# Patient Record
Sex: FEMALE | Race: WHITE | NOT HISPANIC OR LATINO | Employment: FULL TIME | URBAN - METROPOLITAN AREA
[De-identification: names, ages, dates, MRNs, and addresses within clinical notes are randomized per-mention and may not be internally consistent; named-entity substitution may affect disease eponyms.]

---

## 2017-01-24 ENCOUNTER — PATIENT MESSAGE (OUTPATIENT)
Dept: MEDICAL GROUP | Facility: PHYSICIAN GROUP | Age: 47
End: 2017-01-24

## 2017-01-24 DIAGNOSIS — H60.502 ACUTE OTITIS EXTERNA OF LEFT EAR, UNSPECIFIED TYPE: ICD-10-CM

## 2017-01-24 DIAGNOSIS — E03.8 OTHER SPECIFIED HYPOTHYROIDISM: ICD-10-CM

## 2017-01-24 RX ORDER — IBUPROFEN 200 MG
200 TABLET ORAL EVERY 6 HOURS PRN
Qty: 270 TAB | Refills: 3 | Status: SHIPPED | OUTPATIENT
Start: 2017-01-24 | End: 2019-08-21

## 2017-01-24 RX ORDER — LIOTHYRONINE SODIUM 5 UG/1
TABLET ORAL
Qty: 180 TAB | Refills: 3 | Status: SHIPPED | OUTPATIENT
Start: 2017-01-24 | End: 2018-06-25 | Stop reason: SDUPTHER

## 2017-01-24 RX ORDER — NEOMYCIN SULFATE, POLYMYXIN B SULFATE AND HYDROCORTISONE 10; 3.5; 1 MG/ML; MG/ML; [USP'U]/ML
4 SUSPENSION/ DROPS AURICULAR (OTIC) 3 TIMES DAILY
Qty: 1 BOTTLE | Refills: 1 | Status: SHIPPED | OUTPATIENT
Start: 2017-01-24 | End: 2018-02-21 | Stop reason: SDUPTHER

## 2017-01-24 RX ORDER — LEVOTHYROXINE SODIUM 88 UG/1
TABLET ORAL
Qty: 90 TAB | Refills: 3 | Status: SHIPPED | OUTPATIENT
Start: 2017-01-24 | End: 2018-02-21 | Stop reason: SDUPTHER

## 2017-01-24 RX ORDER — ACETAMINOPHEN 325 MG/1
650 TABLET ORAL EVERY 4 HOURS PRN
Qty: 270 TAB | Refills: 3 | Status: SHIPPED | OUTPATIENT
Start: 2017-01-24 | End: 2022-06-07

## 2017-01-24 RX ORDER — LORATADINE 10 MG/1
10 TABLET ORAL DAILY
Qty: 90 TAB | Refills: 3 | Status: SHIPPED | OUTPATIENT
Start: 2017-01-24 | End: 2018-10-24

## 2017-01-24 NOTE — TELEPHONE ENCOUNTER
Patient would also wants a refill on fluocinolone acetonide oil    Was the patient seen in the last year in this department? Yes     Does patient have an active prescription for medications requested? No     Received Request Via: Patient

## 2017-03-16 ENCOUNTER — OFFICE VISIT (OUTPATIENT)
Dept: URGENT CARE | Facility: PHYSICIAN GROUP | Age: 47
End: 2017-03-16
Payer: COMMERCIAL

## 2017-03-16 VITALS
HEART RATE: 85 BPM | SYSTOLIC BLOOD PRESSURE: 114 MMHG | RESPIRATION RATE: 16 BRPM | TEMPERATURE: 99.5 F | DIASTOLIC BLOOD PRESSURE: 78 MMHG | OXYGEN SATURATION: 96 %

## 2017-03-16 DIAGNOSIS — H60.501 ACUTE OTITIS EXTERNA OF RIGHT EAR, UNSPECIFIED TYPE: ICD-10-CM

## 2017-03-16 PROCEDURE — 99214 OFFICE O/P EST MOD 30 MIN: CPT | Performed by: PHYSICIAN ASSISTANT

## 2017-03-16 RX ORDER — CIPROFLOXACIN AND DEXAMETHASONE 3; 1 MG/ML; MG/ML
4 SUSPENSION/ DROPS AURICULAR (OTIC) 2 TIMES DAILY
Qty: 2.8 ML | Refills: 0 | Status: SHIPPED | OUTPATIENT
Start: 2017-03-16 | End: 2017-03-23

## 2017-03-16 NOTE — PROGRESS NOTES
Chief Complaint   Patient presents with   • Otalgia     3 days.        HISTORY OF PRESENT ILLNESS: Patient is a 47 y.o. female who presents today for 3 days of right ear pain.  Worsening overall.   She feels it is in the canal.   She admits she has hx of dermatitis/chronic itching around the ear and will even itch it in her sleep. She started feeling swelling and discomfort in the last few days worsening Tuesday night, Wednesday and now today. She will put her finger there and feel some wetness.   There is muffling to hearing.   Tenderness around the ear.  No fevers or chills.  No preceding URI symptoms.   No hx of DM.   Used her left over Cortisporin and Ofloxacin and did not help.     Patient Active Problem List    Diagnosis Date Noted   • Malignant neoplasm of breast (female) (CMS-HCC) 05/26/2016   • Right wrist pain 08/27/2015   • Personal history of breast cancer 08/27/2015   • ADD (attention deficit disorder) 08/27/2015   • Hypothyroid 08/22/2013       Allergies:Erythromycin    Current Outpatient Prescriptions Ordered in [facility]   Medication Sig Dispense Refill   • liothyronine (CYTOMEL) 5 MCG Tab TAKE 1 TABLET TWICE A  Tab 3   • levothyroxine (SYNTHROID) 88 MCG Tab TAKE 1 TABLET DAILY 90 Tab 3   • amphetamine-dextroamphetamine (ADDERALL) 10 MG Tab      • lisdexamfetamine (VYVANSE) 50 MG capsule Take 50 mg by mouth every morning.     • Calcium Carbonate-Vitamin D (CALCIUM + D PO) Take  by mouth every day.       • neomycin-polymyxin-HC (PEDIOTIC HC) 3.5-29571-5 Suspension Place 4 Drops in ear 3 times a day. 1 Bottle 1   • ibuprofen (MOTRIN) 200 MG Tab Take 1 Tab by mouth every 6 hours as needed for Mild Pain, Fever, Headache or Inflammation. 270 Tab 3   • acetaminophen (TYLENOL) 325 MG Tab Take 2 Tabs by mouth every four hours as needed for Mild Pain, Moderate Pain or Fever. 270 Tab 3   • loratadine (CLARITIN) 10 MG Tab Take 1 Tab by mouth every day. 90 Tab 3   • DM-Doxylamine-Acetaminophen 15-6.  MG/15ML Liquid Take 15 mL by mouth 2 times a day as needed. 1 Bottle 11   • cyclobenzaprine (FLEXERIL) 5 MG tablet Take 1 Tab by mouth 3 times a day as needed for Moderate Pain or Severe Pain. 30 Tab 0   • amoxicillin-clavulanate (AUGMENTIN) 875-125 MG Tab Take 1 Tab by mouth 2 times a day. 20 Tab 0   • Lisdexamfetamine Dimesylate 60 MG Cap Take 60 mg by mouth every day.     • Lorcaserin HCl (BELVIQ) 10 MG Tab Take 1 Tab by mouth 2 Times a Day. 60 Tab 2   • ibuprofen (MOTRIN) 200 MG Tab Take 600 mg by mouth every 6 hours as needed.     • Glucos-Chondroit-Hyaluron-MSM (GLUCOSAMINE CHONDROITIN JOINT PO) Take  by mouth.     • Lisdexamfetamine Dimesylate 60 MG Cap Take 60 mg by mouth every day.     • VITAMIN D, ERGOCALCIFEROL, PO Take  by mouth every day.       • Multiple Vitamin (MULTIVITAMIN PO) Take  by mouth every day.         No current Epic-ordered facility-administered medications on file.       Past Medical History   Diagnosis Date   • Invasive ductal carcinoma of breast (CMS-[facility])      Breast left, Dr. Marte   • ADD (attention deficit disorder with hyperactivity)      Dr. Desi Henderson   • Anesthesia      PONV   • Cancer (CMS-HCC) 2006     breast   • Disorder of thyroid    • Pain      hip, lower back       Social History   Substance Use Topics   • Smoking status: Never Smoker    • Smokeless tobacco: Never Used   • Alcohol Use: 0.0 oz/week     0 Standard drinks or equivalent per week      Comment: 1-2 dinks per month        Family Status   Relation Status Death Age   • Maternal Grandfather       Family History   Problem Relation Age of Onset   • Diabetes Mother    • Hypertension Mother    • Cancer Mother      skin - non-melanoma   • Thyroid Mother    • Diabetes Maternal Grandmother    • Heart Disease Maternal Grandfather    • Hypertension Father    • Cancer Father      prostate   • Stroke Paternal Grandfather    • Heart Disease Maternal Uncle        ROS:  Review of Systems   Constitutional:  Negative for fever, chills, weight loss and malaise/fatigue.   HENT: SEE HPI  Eyes: Negative for blurred vision.   Respiratory: Negative for cough, sputum production, shortness of breath and wheezing.    Cardiovascular: Negative for chest pain, palpitations, orthopnea and leg swelling.   Gastrointestinal: Negative for heartburn, nausea, vomiting and abdominal pain.   All other systems reviewed and are negative.       Exam:   Blood pressure 114/78, pulse 85, temperature 37.5 °C (99.5 °F), resp. rate 16, SpO2 96 %.  General:  Well nourished, well developed female in NAD  Eyes: PERRLA, EOM within normal limits, no conjunctival injection, no scleral icterus, visual fields and acuity grossly intact.  Ears: Normal shape and symmetry, no tenderness, no discharge. External canal on the right with moderate swelling, mild erythema and white exudate in canal.  There is mild tragal tenderness however no mastoid tenderness or periauricular lymphadenopathy.  After gentle lavage of right ear: Tympanic membranes are without any inflammation, no effusion. Gross auditory acuity is intact  Nose: Symmetrical, sinuses without tenderness, no discharge.   Mouth: reasonable hygiene, no erythema exudates or tonsillar enlargement.  Neck: no masses, range of motion within normal limits, no tracheal deviation. No lymphadenopathy  Pulmonary: Normal respiratory effort, no wheezes, crackles, or rhonchi.  Cardiovascular: regular rate and rhythm without murmurs, rubs, or gallops.  Skin: No visible rashes or lesion. Warm, pink, dry.   Extremities: no clubbing, cyanosis, or edema.  Neuro: A&O x 3. Speech normal/clear.  Normal gait.         Assessment/Plan:  1. Acute otitis externa of right ear, unspecified type  ciprofloxacin/dexamethasone (CIPRODEX) 0.3-0.1 % Suspension       -will trial Ciprodex.   There is underlying significant dermatitis.   -recommend PCP and/or ENT follow up  -red flags regarding otitis media discussed, RTC precautions.      Supportive care, differential diagnoses, and indications for immediate follow-up discussed with patient.   Pathogenesis of diagnosis discussed including typical length and natural progression.   Instructed to return to clinic or nearest emergency department for any change in condition, further concerns, or worsening of symptoms.  Patient states understanding of the plan of care and discharge instructions.  Instructed to make an appointment, for follow up, with their primary care provider.      Gertrude Choudhary PA-C

## 2017-04-20 ENCOUNTER — HOSPITAL ENCOUNTER (OUTPATIENT)
Dept: RADIOLOGY | Facility: MEDICAL CENTER | Age: 47
End: 2017-04-20
Attending: FAMILY MEDICINE
Payer: COMMERCIAL

## 2017-04-20 ENCOUNTER — OFFICE VISIT (OUTPATIENT)
Dept: MEDICAL GROUP | Facility: PHYSICIAN GROUP | Age: 47
End: 2017-04-20
Payer: COMMERCIAL

## 2017-04-20 VITALS
SYSTOLIC BLOOD PRESSURE: 116 MMHG | HEART RATE: 78 BPM | DIASTOLIC BLOOD PRESSURE: 68 MMHG | BODY MASS INDEX: 30.05 KG/M2 | WEIGHT: 187 LBS | OXYGEN SATURATION: 94 % | TEMPERATURE: 98.3 F | HEIGHT: 66 IN | RESPIRATION RATE: 14 BRPM

## 2017-04-20 DIAGNOSIS — M25.50 POLYARTHRALGIA: ICD-10-CM

## 2017-04-20 DIAGNOSIS — Z85.3 PERSONAL HISTORY OF BREAST CANCER: ICD-10-CM

## 2017-04-20 DIAGNOSIS — Z13.6 SCREENING FOR CARDIOVASCULAR CONDITION: ICD-10-CM

## 2017-04-20 DIAGNOSIS — M79.641 PAIN IN BOTH HANDS: ICD-10-CM

## 2017-04-20 DIAGNOSIS — M54.16 LUMBAR RADICULOPATHY: ICD-10-CM

## 2017-04-20 DIAGNOSIS — M79.642 PAIN IN BOTH HANDS: ICD-10-CM

## 2017-04-20 DIAGNOSIS — N95.1 PERIMENOPAUSAL SYMPTOMS: ICD-10-CM

## 2017-04-20 DIAGNOSIS — Z13.29 SCREENING FOR ENDOCRINE DISORDER: ICD-10-CM

## 2017-04-20 DIAGNOSIS — E66.9 OBESITY (BMI 30-39.9): ICD-10-CM

## 2017-04-20 DIAGNOSIS — B35.1 ONYCHOMYCOSIS: ICD-10-CM

## 2017-04-20 PROCEDURE — 77077 JOINT SURVEY SINGLE VIEW: CPT

## 2017-04-20 PROCEDURE — 99214 OFFICE O/P EST MOD 30 MIN: CPT | Performed by: FAMILY MEDICINE

## 2017-04-20 PROCEDURE — 72100 X-RAY EXAM L-S SPINE 2/3 VWS: CPT

## 2017-04-20 RX ORDER — ASPIRIN 325 MG
325 TABLET ORAL EVERY 6 HOURS PRN
COMMUNITY
End: 2019-08-21

## 2017-04-20 RX ORDER — TERBINAFINE HYDROCHLORIDE 250 MG/1
250 TABLET ORAL DAILY
Qty: 90 TAB | Refills: 0 | Status: SHIPPED | OUTPATIENT
Start: 2017-04-20 | End: 2017-07-19

## 2017-04-20 ASSESSMENT — PATIENT HEALTH QUESTIONNAIRE - PHQ9: CLINICAL INTERPRETATION OF PHQ2 SCORE: 0

## 2017-04-20 NOTE — MR AVS SNAPSHOT
"        Rosaura Cintron   2017 7:40 AM   Office Visit   MRN: [id number]    Department:  [facility]   Dept Phone:  [phone]    Description:  Female : 1970   Provider:  Gloria Joseph M.D.           Reason for Visit     Labs Only     Nail Problem lft great toe      Allergies as of 2017     Allergen Noted Reactions    Erythromycin 2008   Anaphylaxis, Swelling    Throat      You were diagnosed with     Personal history of breast cancer   [579446]       Perimenopausal symptoms   [901115]       Polyarthralgia   [271672]       Pain in both hands   [5112096]       Screening for endocrine disorder   [3599912]       Screening for cardiovascular condition   [633821]       Lumbar radiculopathy   [357992]       Onychomycosis   [062469]       Obesity (BMI 30-39.9)   [348389]         Vital Signs     Blood Pressure Pulse Temperature Respirations Height Weight    116/68 mmHg 78 36.8 °C (98.3 °F) 14 1.676 m (5' 5.98\") 84.823 kg (187 lb)    Body Mass Index Oxygen Saturation Smoking Status             30.20 kg/m2 94% Never Smoker          Basic Information     Date Of Birth Sex Race Ethnicity Preferred Language    1970 Female White Non- English      Problem List              ICD-10-CM Priority Class Noted - Resolved    Hypothyroid E03.9   2013 - Present    Right wrist pain M25.531   2015 - Present    Personal history of breast cancer Z85.3   2015 - Present    ADD (attention deficit disorder) F98.8   2015 - Present    Malignant neoplasm of breast (female) (CMS-HCC) C50.919   2016 - Present    Obesity (BMI 30-39.9) E66.9   2017 - Present      Health Maintenance        Date Due Completion Dates    IMM DTaP/Tdap/Td Vaccine (2 - Td) 2023            Current Immunizations     Tdap Vaccine 2013      Below and/or attached are the medications your provider expects you to take. Review all of your home medications and newly ordered medications with " your provider and/or pharmacist. Follow medication instructions as directed by your provider and/or pharmacist. Please keep your medication list with you and share with your provider. Update the information when medications are discontinued, doses are changed, or new medications (including over-the-counter products) are added; and carry medication information at all times in the event of emergency situations     Allergies:  ERYTHROMYCIN - Anaphylaxis,Swelling               Medications  Valid as of: April 20, 2017 -  8:28 AM    Generic Name Brand Name Tablet Size Instructions for use    Acetaminophen (Tab) TYLENOL 325 MG Take 2 Tabs by mouth every four hours as needed for Mild Pain, Moderate Pain or Fever.        Amoxicillin-Pot Clavulanate (Tab) AUGMENTIN 875-125 MG Take 1 Tab by mouth 2 times a day.        Amphetamine-Dextroamphetamine (Tab) ADDERALL 10 MG         Aspirin (Tab)  MG Take 325 mg by mouth every 6 hours as needed.        Calcium Citrate-Vitamin D   Take  by mouth every day.          Cyclobenzaprine HCl (Tab) FLEXERIL 5 MG Take 1 Tab by mouth 3 times a day as needed for Moderate Pain or Severe Pain.        DM-Doxylamine-Acetaminophen (Liquid) DM-Doxylamine-Acetaminophen 15-6. MG/15ML Take 15 mL by mouth 2 times a day as needed.        Ergocalciferol   Take  by mouth every day.          Glucos-Chondroit-Hyaluron-MSM   Take  by mouth.        Ibuprofen (Tab) MOTRIN 200 MG Take 600 mg by mouth every 6 hours as needed.        Ibuprofen (Tab) MOTRIN 200 MG Take 1 Tab by mouth every 6 hours as needed for Mild Pain, Fever, Headache or Inflammation.        Levothyroxine Sodium (Tab) SYNTHROID 88 MCG TAKE 1 TABLET DAILY        Liothyronine Sodium (Tab) CYTOMEL 5 MCG TAKE 1 TABLET TWICE A DAY        Lisdexamfetamine Dimesylate (Cap) Lisdexamfetamine Dimesylate 60 MG Take 60 mg by mouth every day.        Lisdexamfetamine Dimesylate (Cap) Lisdexamfetamine Dimesylate 60 MG Take 60 mg by mouth every day.          Lisdexamfetamine Dimesylate (Cap) VYVANSE 50 MG Take 50 mg by mouth every morning.        Loratadine (Tab) CLARITIN 10 MG Take 1 Tab by mouth every day.        Multiple Vitamins-Minerals   Take  by mouth every day.          Neomycin-Polymyxin-HC (Suspension) PEDIOTIC HC 3.5-95361-1 Place 4 Drops in ear 3 times a day.        Terbinafine HCl (Tab) LAMISIL 250 MG Take 1 Tab by mouth every day for 90 days.        .                 Medicines prescribed today were sent to:     [facility] - [place], [place] - [address]    [address] [address]    Phone: [phone] Fax: [phone]    Open 24 Hours?: No    [id number] - [place], [place] - [address]    [address] [address]    Phone: [phone] Fax: [phone]    Open 24 Hours?: No      Medication refill instructions:       If your prescription bottle indicates you have medication refills left, it is not necessary to call your provider’s office. Please contact your pharmacy and they will refill your medication.    If your prescription bottle indicates you do not have any refills left, you may request refills at any time through one of the following ways: The online [employer] system (except Urgent Care), by calling your provider’s office, or by asking your pharmacy to contact your provider’s office with a refill request. Medication refills are processed only during regular business hours and may not be available until the next business day. Your provider may request additional information or to have a follow-up visit with you prior to refilling your medication.   *Please Note: Medication refills are assigned a new Rx number when refilled electronically. Your pharmacy may indicate that no refills were authorized even though a new prescription for the same medication is available at the pharmacy. Please request the medicine by name with the pharmacy before contacting your provider for a  refill.        Your To Do List     Future Labs/Procedures Complete By Expires    HEPATIC FUNCTION PANEL  5/20/2017 4/20/2018    ANTI-NUCLEAR ANTIBODY SERUM  As directed 4/20/2018    CANCER ANTIGEN 125  As directed 4/20/2018    CBC WITH DIFFERENTIAL  As directed 4/20/2018    COMP METABOLIC PANEL  As directed 4/20/2018    DX-JOINT SURVEY-HANDS SINGLE VIEW  As directed 4/20/2018    DX-LUMBAR SPINE-2 OR 3 VIEWS  As directed 4/20/2018    ESTRADIOL  As directed 4/20/2018    FSH/LH  As directed 4/20/2018    LIPID PROFILE  As directed 4/20/2018    RHEUMATOID ARTHRITIS FACTOR  As directed 4/20/2018    TSH WITH REFLEX TO FT4  As directed 4/20/2018    VITAMIN D,25 HYDROXY  As directed 4/20/2018         [employer] Access Code: Activation code not generated  Current [employer] Status: Active

## 2017-04-20 NOTE — PROGRESS NOTES
Chief Complaint   Patient presents with   • Labs Only   • Nail Problem     lft great toe       HISTORY OF PRESENT ILLNESS: Patient is a 47 y.o. female established patient here today for the following concerns:    1. Personal history of breast cancer  Due for  screening.  S/P bilateral mastectomy.  No new chest masses.  Not candidate for mammography due to mastectomy.      2. Perimenopausal symptoms  Getting hot flashes.  Some hair loss.  S/p hysterectomy.      3. Polyarthralgia  4. Pain in both hands  Experiencing worsening pain and swelling of the DIP joints.  Reports that she will occasionally get pain into the wrists too.  Mom has hx of OA.  Quite painful.  Improves with NSAID use.      5. Screening for endocrine disorder    6. Screening for cardiovascular condition  Due for labs     7. Lumbar radiculopathy  Reports that she has been experiencing some back pain with worsening pain radiating down the right leg.  No bowel of bladder changes.  No leg weakness.  No fevers, chills or unintentional weight loss.  No new injuries.  Has been more sedentary lately.     8. Onychomycosis  Reports improvement since treatment last year with Lamisil, but that the left great toe is still not completely clearing.  Did try Jublia without help.  Using Kerasin, which has helped control the right great toe nail.  Interested in another round of lamisil.  Did have + fungus on nail clippings with podiatry.      9. Obesity (BMI 30-39.9)  Reports that she did not get the Belviq as it was too expensive, and she was concerned about possible side effects.  Reports that she has been more sedentary and not exercising like she used to.       Past Medical, Social, and Family history reviewed and updated in EPIC    Allergies:Erythromycin    Current Outpatient Prescriptions   Medication Sig Dispense Refill   • aspirin (ASA) 325 MG Tab Take 325 mg by mouth every 6 hours as needed.     • terbinafine (LAMISIL) 250 MG Tab Take 1 Tab by mouth  every day for 90 days. 90 Tab 0   • liothyronine (CYTOMEL) 5 MCG Tab TAKE 1 TABLET TWICE A  Tab 3   • levothyroxine (SYNTHROID) 88 MCG Tab TAKE 1 TABLET DAILY 90 Tab 3   • ibuprofen (MOTRIN) 200 MG Tab Take 1 Tab by mouth every 6 hours as needed for Mild Pain, Fever, Headache or Inflammation. 270 Tab 3   • acetaminophen (TYLENOL) 325 MG Tab Take 2 Tabs by mouth every four hours as needed for Mild Pain, Moderate Pain or Fever. 270 Tab 3   • amphetamine-dextroamphetamine (ADDERALL) 10 MG Tab      • Lisdexamfetamine Dimesylate 60 MG Cap Take 60 mg by mouth every day.     • VITAMIN D, ERGOCALCIFEROL, PO Take  by mouth every day.       • Multiple Vitamin (MULTIVITAMIN PO) Take  by mouth every day.       • neomycin-polymyxin-HC (PEDIOTIC HC) 3.5-08133-5 Suspension Place 4 Drops in ear 3 times a day. 1 Bottle 1   • loratadine (CLARITIN) 10 MG Tab Take 1 Tab by mouth every day. 90 Tab 3   • DM-Doxylamine-Acetaminophen 15-6. MG/15ML Liquid Take 15 mL by mouth 2 times a day as needed. 1 Bottle 11   • lisdexamfetamine (VYVANSE) 50 MG capsule Take 50 mg by mouth every morning.     • cyclobenzaprine (FLEXERIL) 5 MG tablet Take 1 Tab by mouth 3 times a day as needed for Moderate Pain or Severe Pain. 30 Tab 0   • amoxicillin-clavulanate (AUGMENTIN) 875-125 MG Tab Take 1 Tab by mouth 2 times a day. 20 Tab 0   • Lisdexamfetamine Dimesylate 60 MG Cap Take 60 mg by mouth every day.     • ibuprofen (MOTRIN) 200 MG Tab Take 600 mg by mouth every 6 hours as needed.     • Glucos-Chondroit-Hyaluron-MSM (GLUCOSAMINE CHONDROITIN JOINT PO) Take  by mouth.     • Calcium Carbonate-Vitamin D (CALCIUM + D PO) Take  by mouth every day.         No current facility-administered medications for this visit.         ROS:  Review of Systems   Constitutional: Negative for fever, chills, weight loss and malaise/fatigue.   HENT: Negative for ear pain, nosebleeds, congestion, sore throat and neck pain.    Eyes: Negative for blurred vision.  "  Respiratory: Negative for cough, sputum production, shortness of breath and wheezing.    Cardiovascular: Negative for chest pain, palpitations,  and leg swelling.   Gastrointestinal: Negative for heartburn, nausea, vomiting, diarrhea and abdominal pain.   Genitourinary: Negative for dysuria, urgency and frequency.   Musculoskeletal: Negative for myalgias,+ back pain and joint pain.   Skin: Negative for rash and itching.   Neurological: Negative for dizziness, tingling, tremors, sensory change, focal weakness and headaches.   Endo/Heme/Allergies: Does not bruise/bleed easily.   Psychiatric/Behavioral: Negative for depression, anxiety, suicidal ideas, insomnia and memory loss.      Exam:  Blood pressure 116/68, pulse 78, temperature 36.8 °C (98.3 °F), resp. rate 14, height 1.676 m (5' 5.98\"), weight 84.823 kg (187 lb), SpO2 94 %.    General:  Well nourished, well developed in NAD  Head is grossly normal.  Neck: Supple without JVD   Pulmonary:  Normal effort.   Cardiovascular: Regular rate  Extremities: no clubbing, cyanosis, or edema.  Psych: affect appropriate  Neg straight leg testing, full ROM of lumbar spine,     Please note that this dictation was created using voice recognition software. I have made every reasonable attempt to correct obvious errors, but I expect that there are errors of grammar and possibly content that I did not discover before finalizing the note.    Assessment/Plan:  1. Personal history of breast cancer  Check     - CANCER ANTIGEN 125; Future    2. Perimenopausal symptoms  Check   - FSH/LH; Future  - TSH WITH REFLEX TO FT4; Future  - ESTRADIOL; Future    3. Polyarthralgia  check  - ANTI-NUCLEAR ANTIBODY SERUM; Future  - RHEUMATOID ARTHRITIS FACTOR; Future  - DX-JOINT SURVEY-HANDS SINGLE VIEW; Future  - CBC WITH DIFFERENTIAL; Future    4. Pain in both hands  Distribution is consistent with OA  - ANTI-NUCLEAR ANTIBODY SERUM; Future  - RHEUMATOID ARTHRITIS FACTOR; Future  - DX-JOINT " SURVEY-HANDS SINGLE VIEW; Future    5. Screening for endocrine disorder  Check   - VITAMIN D,25 HYDROXY; Future    6. Screening for cardiovascular condition    - LIPID PROFILE; Future  - COMP METABOLIC PANEL; Future    7. Lumbar radiculopathy  Check    - DX-LUMBAR SPINE-2 OR 3 VIEWS; Future    8. Onychomycosis  Trial of second course of, follow liver enzymes in 1 month  - terbinafine (LAMISIL) 250 MG Tab; Take 1 Tab by mouth every day for 90 days.  Dispense: 90 Tab; Refill: 0  - HEPATIC FUNCTION PANEL; Future    9. Obesity (BMI 30-39.9)  Counseled.   - Patient identified as having weight management issue.  Appropriate orders and counseling given.    Follow up in 3 months, sooner prn.

## 2017-04-22 ENCOUNTER — HOSPITAL ENCOUNTER (OUTPATIENT)
Dept: LAB | Facility: MEDICAL CENTER | Age: 47
End: 2017-04-22
Attending: FAMILY MEDICINE
Payer: COMMERCIAL

## 2017-04-22 DIAGNOSIS — B35.1 ONYCHOMYCOSIS: ICD-10-CM

## 2017-04-22 DIAGNOSIS — M25.50 POLYARTHRALGIA: ICD-10-CM

## 2017-04-22 DIAGNOSIS — M79.642 PAIN IN BOTH HANDS: ICD-10-CM

## 2017-04-22 DIAGNOSIS — Z85.3 PERSONAL HISTORY OF BREAST CANCER: ICD-10-CM

## 2017-04-22 DIAGNOSIS — M79.641 PAIN IN BOTH HANDS: ICD-10-CM

## 2017-04-22 DIAGNOSIS — N95.1 PERIMENOPAUSAL SYMPTOMS: ICD-10-CM

## 2017-04-22 DIAGNOSIS — Z13.6 SCREENING FOR CARDIOVASCULAR CONDITION: ICD-10-CM

## 2017-04-22 DIAGNOSIS — Z13.29 SCREENING FOR ENDOCRINE DISORDER: ICD-10-CM

## 2017-04-22 LAB
25(OH)D3 SERPL-MCNC: 24 NG/ML (ref 30–100)
ALBUMIN SERPL BCP-MCNC: 4.1 G/DL (ref 3.2–4.9)
ALBUMIN/GLOB SERPL: 1.2 G/DL
ALP SERPL-CCNC: 59 U/L (ref 30–99)
ALT SERPL-CCNC: 17 U/L (ref 2–50)
ANION GAP SERPL CALC-SCNC: 6 MMOL/L (ref 0–11.9)
AST SERPL-CCNC: 18 U/L (ref 12–45)
BASOPHILS # BLD AUTO: 0.9 % (ref 0–1.8)
BASOPHILS # BLD: 0.07 K/UL (ref 0–0.12)
BILIRUB CONJ SERPL-MCNC: <0.1 MG/DL (ref 0.1–0.5)
BILIRUB INDIRECT SERPL-MCNC: NORMAL MG/DL (ref 0–1)
BILIRUB SERPL-MCNC: 0.4 MG/DL (ref 0.1–1.5)
BUN SERPL-MCNC: 15 MG/DL (ref 8–22)
CALCIUM SERPL-MCNC: 9.2 MG/DL (ref 8.5–10.5)
CANCER AG125 SERPL-ACNC: 17.7 U/ML (ref 0–35)
CHLORIDE SERPL-SCNC: 106 MMOL/L (ref 96–112)
CHOLEST SERPL-MCNC: 252 MG/DL (ref 100–199)
CO2 SERPL-SCNC: 26 MMOL/L (ref 20–33)
CREAT SERPL-MCNC: 0.72 MG/DL (ref 0.5–1.4)
EOSINOPHIL # BLD AUTO: 0.46 K/UL (ref 0–0.51)
EOSINOPHIL NFR BLD: 5.7 % (ref 0–6.9)
ERYTHROCYTE [DISTWIDTH] IN BLOOD BY AUTOMATED COUNT: 42.8 FL (ref 35.9–50)
ESTRADIOL SERPL-MCNC: 27 PG/ML
FSH SERPL-ACNC: 7.1 MIU/ML
GFR SERPL CREATININE-BSD FRML MDRD: >60 ML/MIN/1.73 M 2
GLOBULIN SER CALC-MCNC: 3.4 G/DL (ref 1.9–3.5)
GLUCOSE SERPL-MCNC: 82 MG/DL (ref 65–99)
HCT VFR BLD AUTO: 47.5 % (ref 37–47)
HDLC SERPL-MCNC: 51 MG/DL
HGB BLD-MCNC: 15.2 G/DL (ref 12–16)
IMM GRANULOCYTES # BLD AUTO: 0.02 K/UL (ref 0–0.11)
IMM GRANULOCYTES NFR BLD AUTO: 0.2 % (ref 0–0.9)
LDLC SERPL CALC-MCNC: 163 MG/DL
LH SERPL-ACNC: 7 IU/L
LYMPHOCYTES # BLD AUTO: 2.53 K/UL (ref 1–4.8)
LYMPHOCYTES NFR BLD: 31.5 % (ref 22–41)
MCH RBC QN AUTO: 29.7 PG (ref 27–33)
MCHC RBC AUTO-ENTMCNC: 32 G/DL (ref 33.6–35)
MCV RBC AUTO: 92.8 FL (ref 81.4–97.8)
MONOCYTES # BLD AUTO: 0.4 K/UL (ref 0–0.85)
MONOCYTES NFR BLD AUTO: 5 % (ref 0–13.4)
NEUTROPHILS # BLD AUTO: 4.56 K/UL (ref 2–7.15)
NEUTROPHILS NFR BLD: 56.7 % (ref 44–72)
NRBC # BLD AUTO: 0 K/UL
NRBC BLD AUTO-RTO: 0 /100 WBC
PLATELET # BLD AUTO: 357 K/UL (ref 164–446)
PMV BLD AUTO: 10.3 FL (ref 9–12.9)
POTASSIUM SERPL-SCNC: 4.2 MMOL/L (ref 3.6–5.5)
PROT SERPL-MCNC: 7.5 G/DL (ref 6–8.2)
RBC # BLD AUTO: 5.12 M/UL (ref 4.2–5.4)
RHEUMATOID FACT SER IA-ACNC: <10 IU/ML (ref 0–14)
SODIUM SERPL-SCNC: 138 MMOL/L (ref 135–145)
TRIGL SERPL-MCNC: 188 MG/DL (ref 0–149)
TSH SERPL DL<=0.005 MIU/L-ACNC: 1.32 UIU/ML (ref 0.3–3.7)
WBC # BLD AUTO: 8 K/UL (ref 4.8–10.8)

## 2017-04-22 PROCEDURE — 83001 ASSAY OF GONADOTROPIN (FSH): CPT

## 2017-04-22 PROCEDURE — 82248 BILIRUBIN DIRECT: CPT

## 2017-04-22 PROCEDURE — 82306 VITAMIN D 25 HYDROXY: CPT

## 2017-04-22 PROCEDURE — 84443 ASSAY THYROID STIM HORMONE: CPT

## 2017-04-22 PROCEDURE — 86038 ANTINUCLEAR ANTIBODIES: CPT

## 2017-04-22 PROCEDURE — 86431 RHEUMATOID FACTOR QUANT: CPT

## 2017-04-22 PROCEDURE — 83002 ASSAY OF GONADOTROPIN (LH): CPT

## 2017-04-22 PROCEDURE — 80053 COMPREHEN METABOLIC PANEL: CPT

## 2017-04-22 PROCEDURE — 80061 LIPID PANEL: CPT

## 2017-04-22 PROCEDURE — 86304 IMMUNOASSAY TUMOR CA 125: CPT

## 2017-04-22 PROCEDURE — 85025 COMPLETE CBC W/AUTO DIFF WBC: CPT

## 2017-04-22 PROCEDURE — 36415 COLL VENOUS BLD VENIPUNCTURE: CPT

## 2017-04-22 PROCEDURE — 82670 ASSAY OF TOTAL ESTRADIOL: CPT

## 2017-04-24 ENCOUNTER — PATIENT MESSAGE (OUTPATIENT)
Dept: MEDICAL GROUP | Facility: PHYSICIAN GROUP | Age: 47
End: 2017-04-24

## 2017-04-24 DIAGNOSIS — M54.16 LUMBAR RADICULOPATHY: ICD-10-CM

## 2017-04-24 LAB — NUCLEAR IGG SER QL IA: NORMAL

## 2017-04-25 ENCOUNTER — TELEPHONE (OUTPATIENT)
Dept: MEDICAL GROUP | Facility: PHYSICIAN GROUP | Age: 47
End: 2017-04-25

## 2017-04-26 NOTE — TELEPHONE ENCOUNTER
----- Message from Gloria Joseph M.D. sent at 4/25/2017  5:25 PM PDT -----  Please call patient to ensure they received their results through My Chart.  The patient requires follow up office visit.

## 2017-04-28 ENCOUNTER — PATIENT MESSAGE (OUTPATIENT)
Dept: MEDICAL GROUP | Facility: PHYSICIAN GROUP | Age: 47
End: 2017-04-28

## 2017-05-16 ENCOUNTER — PATIENT MESSAGE (OUTPATIENT)
Dept: MEDICAL GROUP | Facility: PHYSICIAN GROUP | Age: 47
End: 2017-05-16

## 2017-05-16 DIAGNOSIS — E78.2 MIXED HYPERLIPIDEMIA: ICD-10-CM

## 2017-06-12 ENCOUNTER — PATIENT MESSAGE (OUTPATIENT)
Dept: MEDICAL GROUP | Facility: PHYSICIAN GROUP | Age: 47
End: 2017-06-12

## 2017-06-12 ENCOUNTER — HOSPITAL ENCOUNTER (OUTPATIENT)
Dept: LAB | Facility: MEDICAL CENTER | Age: 47
End: 2017-06-12
Attending: FAMILY MEDICINE
Payer: COMMERCIAL

## 2017-06-12 DIAGNOSIS — E78.2 MIXED HYPERLIPIDEMIA: ICD-10-CM

## 2017-06-12 LAB
CHOLEST SERPL-MCNC: 224 MG/DL (ref 100–199)
HDLC SERPL-MCNC: 45 MG/DL
LDLC SERPL CALC-MCNC: 124 MG/DL
TRIGL SERPL-MCNC: 275 MG/DL (ref 0–149)

## 2017-06-12 PROCEDURE — 80061 LIPID PANEL: CPT

## 2017-06-12 PROCEDURE — 36415 COLL VENOUS BLD VENIPUNCTURE: CPT

## 2017-06-13 ENCOUNTER — TELEPHONE (OUTPATIENT)
Dept: MEDICAL GROUP | Facility: PHYSICIAN GROUP | Age: 47
End: 2017-06-13

## 2017-06-13 NOTE — TELEPHONE ENCOUNTER
Please call the lab and see if they can add on LFTs (hepatic function panel) to lab drawn.    Gloria

## 2017-06-13 NOTE — TELEPHONE ENCOUNTER
----- Message from Gloria Joseph M.D. sent at 6/13/2017 10:12 AM PDT -----  Cholesterol is a bit high.  Bad cholesterol is less, but Triglycerides are higher.  Follow up as planned to discuss

## 2017-06-14 ENCOUNTER — TELEPHONE (OUTPATIENT)
Dept: MEDICAL GROUP | Facility: PHYSICIAN GROUP | Age: 47
End: 2017-06-14

## 2017-06-14 NOTE — TELEPHONE ENCOUNTER
ESTABLISHED PATIENT PRE-VISIT PLANNING     Note: Patient will not be contacted if there is no indication to call.     1.  Reviewed notes from the last few office visits within the medical group: Yes    2.  If any orders were placed at last visit or intended to be done for this visit (i.e. 6 mos follow-up), do we have Results/Consult Notes?        •  Labs - Labs ordered, completed and results are in chart.       •  Imaging - Imaging ordered, completed and results are in chart.       •  Referrals - Referral ordered, patient has NOT been seen.    3. Is this appointment scheduled as a Hospital Follow-Up? No    4.  Immunizations were updated in Epic using WebIZ?: Epic matches WebIZ       •  Web Iz Recommendations: Patient is up to date on all vaccines    5.  Patient is due for the following Health Maintenance Topics:   There are no preventive care reminders to display for this patient.    - Patient is up-to-date on all Health Maintenance topics. No records have been requested at this time.

## 2017-06-16 ENCOUNTER — OFFICE VISIT (OUTPATIENT)
Dept: MEDICAL GROUP | Facility: PHYSICIAN GROUP | Age: 47
End: 2017-06-16
Payer: COMMERCIAL

## 2017-06-16 VITALS
TEMPERATURE: 98.4 F | OXYGEN SATURATION: 99 % | DIASTOLIC BLOOD PRESSURE: 70 MMHG | SYSTOLIC BLOOD PRESSURE: 114 MMHG | WEIGHT: 187 LBS | RESPIRATION RATE: 14 BRPM | HEART RATE: 90 BPM | BODY MASS INDEX: 30.05 KG/M2 | HEIGHT: 66 IN

## 2017-06-16 DIAGNOSIS — B35.1 ONYCHOMYCOSIS: ICD-10-CM

## 2017-06-16 DIAGNOSIS — M19.041 PRIMARY OSTEOARTHRITIS OF BOTH HANDS: ICD-10-CM

## 2017-06-16 DIAGNOSIS — M19.042 PRIMARY OSTEOARTHRITIS OF BOTH HANDS: ICD-10-CM

## 2017-06-16 DIAGNOSIS — E78.2 MIXED HYPERLIPIDEMIA: ICD-10-CM

## 2017-06-16 DIAGNOSIS — E03.9 ACQUIRED HYPOTHYROIDISM: ICD-10-CM

## 2017-06-16 PROBLEM — E78.5 HYPERLIPIDEMIA: Status: ACTIVE | Noted: 2017-06-16

## 2017-06-16 PROCEDURE — 99214 OFFICE O/P EST MOD 30 MIN: CPT | Performed by: FAMILY MEDICINE

## 2017-06-16 RX ORDER — LISDEXAMFETAMINE DIMESYLATE CAPSULES 70 MG/1
70 CAPSULE ORAL EVERY MORNING
COMMUNITY
End: 2020-10-29

## 2017-06-16 NOTE — PROGRESS NOTES
Chief Complaint   Patient presents with   • Follow-Up     labs       HISTORY OF PRESENT ILLNESS: Patient is a 47 y.o. female established patient here today for the following concerns:    1. Onychomycosis  Here today for follow up.  Taking the lamisil without any adverse effects noted.  Unfortunately, looks like she did not get the LFTs including in last lab draw.  No jaundince, abdominal pains or scleral icterus.      2. Acquired hypothyroidism  Continues on levothyroxine and cytomel, but still reports tremendous fatigue particularly in the afternoons.  SHe reports its nearly as bad as when she was going through Chemo.      3. Primary osteoarthritis of both hands  Reports that she restarted the voltaren gel which has been effective in helping with the hand pain.  No redness.  Negative for RA on testing.  Pain and stiffness worse by the end of the day.     4. Mixed hyperlipidemia  Here today for lab review.  Lipids are a bit elevated.  + famhx of hyperlipidemia.  No early of CAD.        Past Medical, Social, and Family history reviewed and updated in EPIC    Allergies:Erythromycin    Current Outpatient Prescriptions   Medication Sig Dispense Refill   • Diclofenac Sodium (VOLTAREN) 1 % Gel Apply 2 g to skin as directed 3 times a day as needed. 1 Tube 3   • lisdexamfetamine (VYVANSE) 70 MG capsule Take 70 mg by mouth every morning.     • aspirin (ASA) 325 MG Tab Take 325 mg by mouth every 6 hours as needed.     • terbinafine (LAMISIL) 250 MG Tab Take 1 Tab by mouth every day for 90 days. 90 Tab 0   • neomycin-polymyxin-HC (PEDIOTIC HC) 3.5-39071-2 Suspension Place 4 Drops in ear 3 times a day. 1 Bottle 1   • liothyronine (CYTOMEL) 5 MCG Tab TAKE 1 TABLET TWICE A  Tab 3   • levothyroxine (SYNTHROID) 88 MCG Tab TAKE 1 TABLET DAILY 90 Tab 3   • ibuprofen (MOTRIN) 200 MG Tab Take 1 Tab by mouth every 6 hours as needed for Mild Pain, Fever, Headache or Inflammation. 270 Tab 3   • acetaminophen (TYLENOL) 325 MG Tab  Take 2 Tabs by mouth every four hours as needed for Mild Pain, Moderate Pain or Fever. 270 Tab 3   • loratadine (CLARITIN) 10 MG Tab Take 1 Tab by mouth every day. 90 Tab 3   • DM-Doxylamine-Acetaminophen 15-6. MG/15ML Liquid Take 15 mL by mouth 2 times a day as needed. 1 Bottle 11   • amphetamine-dextroamphetamine (ADDERALL) 10 MG Tab      • lisdexamfetamine (VYVANSE) 50 MG capsule Take 50 mg by mouth every morning.     • cyclobenzaprine (FLEXERIL) 5 MG tablet Take 1 Tab by mouth 3 times a day as needed for Moderate Pain or Severe Pain. 30 Tab 0   • amoxicillin-clavulanate (AUGMENTIN) 875-125 MG Tab Take 1 Tab by mouth 2 times a day. 20 Tab 0   • Lisdexamfetamine Dimesylate 60 MG Cap Take 60 mg by mouth every day.     • ibuprofen (MOTRIN) 200 MG Tab Take 600 mg by mouth every 6 hours as needed.     • Glucos-Chondroit-Hyaluron-MSM (GLUCOSAMINE CHONDROITIN JOINT PO) Take  by mouth.     • Lisdexamfetamine Dimesylate 60 MG Cap Take 60 mg by mouth every day.     • VITAMIN D, ERGOCALCIFEROL, PO Take  by mouth every day.       • Multiple Vitamin (MULTIVITAMIN PO) Take  by mouth every day.       • Calcium Carbonate-Vitamin D (CALCIUM + D PO) Take  by mouth every day.         No current facility-administered medications for this visit.         ROS:  Review of Systems   Constitutional: Negative for fever, chills, weight loss and +malaise/fatigue.   HENT: Negative for ear pain, nosebleeds, congestion, sore throat and neck pain.    Eyes: Negative for blurred vision.   Respiratory: Negative for cough, sputum production, shortness of breath and wheezing.    Cardiovascular: Negative for chest pain, palpitations,  and leg swelling.   Gastrointestinal: Negative for heartburn, nausea, vomiting, diarrhea and abdominal pain.   Genitourinary: Negative for dysuria, urgency and frequency.   Musculoskeletal: Negative for myalgias, back pain and joint pain.   Skin: Negative for rash and itching.   Neurological: Negative for  "dizziness, tingling, tremors, sensory change, focal weakness and headaches.   Endo/Heme/Allergies: Does not bruise/bleed easily.   Psychiatric/Behavioral: Negative for depression, anxiety, suicidal ideas, insomnia and memory loss.      Exam:  Blood pressure 114/70, pulse 90, temperature 36.9 °C (98.4 °F), resp. rate 14, height 1.676 m (5' 5.98\"), weight 84.823 kg (187 lb), SpO2 99 %.    General:  Well nourished, well developed in NAD  Head is grossly normal.  Neck: Supple without JVD   Pulmonary:  Normal effort.   Cardiovascular: Regular rate  Extremities: no clubbing, cyanosis, or edema.  Psych: affect appropriate      Please note that this dictation was created using voice recognition software. I have made every reasonable attempt to correct obvious errors, but I expect that there are errors of grammar and possibly content that I did not discover before finalizing the note.    Assessment/Plan:  1. Onychomycosis  - HEPATIC FUNCTION PANEL; Future    2. Acquired hypothyroidism  Check levels, if room we will increase thyroid medication  - TSH; Future  - FREE THYROXINE; Future  - TRIIDOTHYRONINE; Future    3. Primary osteoarthritis of both hands  Renewed.   - Diclofenac Sodium (VOLTAREN) 1 % Gel; Apply 2 g to skin as directed 3 times a day as needed.  Dispense: 1 Tube; Refill: 3    4. Mixed hyperlipidemia  Continue with dietary changes.  Check thyroid particularly with the elevated TG    Follow up prn pending labs.           "

## 2017-06-16 NOTE — MR AVS SNAPSHOT
"        Rosaura Cintron   2017 8:20 AM   Office Visit   MRN: [id number]    Department:  [facility]   Dept Phone:  [phone]    Description:  Female : 1970   Provider:  Gloria Joseph M.D.           Reason for Visit     Follow-Up labs      Allergies as of 2017     Allergen Noted Reactions    Erythromycin 2008   Anaphylaxis, Swelling    Throat      You were diagnosed with     Onychomycosis   [582142]       Acquired hypothyroidism   [7608849]       Right wrist pain   [406134]         Vital Signs     Blood Pressure Pulse Temperature Respirations Height Weight    114/70 mmHg 90 36.9 °C (98.4 °F) 14 1.676 m (5' 5.98\") 84.823 kg (187 lb)    Body Mass Index Oxygen Saturation Smoking Status             30.20 kg/m2 99% Never Smoker          Basic Information     Date Of Birth Sex Race Ethnicity Preferred Language    1970 Female White Non- English      Problem List              ICD-10-CM Priority Class Noted - Resolved    Hypothyroid E03.9   2013 - Present    Right wrist pain M25.531   2015 - Present    Personal history of breast cancer Z85.3   2015 - Present    ADD (attention deficit disorder) F98.8   2015 - Present    Malignant neoplasm of breast (female) (CMS-HCC) C50.919   2016 - Present    Obesity (BMI 30-39.9) E66.9   2017 - Present      Health Maintenance        Date Due Completion Dates    IMM DTaP/Tdap/Td Vaccine (2 - Td) 2023            Current Immunizations     Tdap Vaccine 2013      Below and/or attached are the medications your provider expects you to take. Review all of your home medications and newly ordered medications with your provider and/or pharmacist. Follow medication instructions as directed by your provider and/or pharmacist. Please keep your medication list with you and share with your provider. Update the information when medications are discontinued, doses are changed, or new medications (including " over-the-counter products) are added; and carry medication information at all times in the event of emergency situations     Allergies:  ERYTHROMYCIN - Anaphylaxis,Swelling               Medications  Valid as of: June 16, 2017 -  8:41 AM    Generic Name Brand Name Tablet Size Instructions for use    Acetaminophen (Tab) TYLENOL 325 MG Take 2 Tabs by mouth every four hours as needed for Mild Pain, Moderate Pain or Fever.        Amoxicillin-Pot Clavulanate (Tab) AUGMENTIN 875-125 MG Take 1 Tab by mouth 2 times a day.        Amphetamine-Dextroamphetamine (Tab) ADDERALL 10 MG         Aspirin (Tab)  MG Take 325 mg by mouth every 6 hours as needed.        Calcium Citrate-Vitamin D   Take  by mouth every day.          Cyclobenzaprine HCl (Tab) FLEXERIL 5 MG Take 1 Tab by mouth 3 times a day as needed for Moderate Pain or Severe Pain.        Diclofenac Sodium (Gel) Diclofenac Sodium 1 % Apply 2 g to skin as directed 3 times a day as needed.        DM-Doxylamine-Acetaminophen (Liquid) DM-Doxylamine-Acetaminophen 15-6. MG/15ML Take 15 mL by mouth 2 times a day as needed.        Ergocalciferol   Take  by mouth every day.          Glucos-Chondroit-Hyaluron-MSM   Take  by mouth.        Ibuprofen (Tab) MOTRIN 200 MG Take 600 mg by mouth every 6 hours as needed.        Ibuprofen (Tab) MOTRIN 200 MG Take 1 Tab by mouth every 6 hours as needed for Mild Pain, Fever, Headache or Inflammation.        Levothyroxine Sodium (Tab) SYNTHROID 88 MCG TAKE 1 TABLET DAILY        Liothyronine Sodium (Tab) CYTOMEL 5 MCG TAKE 1 TABLET TWICE A DAY        Lisdexamfetamine Dimesylate (Cap) Lisdexamfetamine Dimesylate 60 MG Take 60 mg by mouth every day.        Lisdexamfetamine Dimesylate (Cap) Lisdexamfetamine Dimesylate 60 MG Take 60 mg by mouth every day.        Lisdexamfetamine Dimesylate (Cap) VYVANSE 50 MG Take 50 mg by mouth every morning.        Lisdexamfetamine Dimesylate (Cap) VYVANSE 70 MG Take 70 mg by mouth every morning.          Loratadine (Tab) CLARITIN 10 MG Take 1 Tab by mouth every day.        Multiple Vitamins-Minerals   Take  by mouth every day.          Neomycin-Polymyxin-HC (Suspension) PEDIOTIC HC 3.5-14775-3 Place 4 Drops in ear 3 times a day.        Terbinafine HCl (Tab) LAMISIL 250 MG Take 1 Tab by mouth every day for 90 days.        .                 Medicines prescribed today were sent to:     [facility] - [place], [place] - [address]    [address] [address]    Phone: [phone] Fax: [phone]    Open 24 Hours?: No    [id number] - [place], [place] - [address]    [address] [place] [address]    Phone: [phone] Fax: [phone]    Open 24 Hours?: No      Medication refill instructions:       If your prescription bottle indicates you have medication refills left, it is not necessary to call your provider’s office. Please contact your pharmacy and they will refill your medication.    If your prescription bottle indicates you do not have any refills left, you may request refills at any time through one of the following ways: The online [employer] system (except Urgent Care), by calling your provider’s office, or by asking your pharmacy to contact your provider’s office with a refill request. Medication refills are processed only during regular business hours and may not be available until the next business day. Your provider may request additional information or to have a follow-up visit with you prior to refilling your medication.   *Please Note: Medication refills are assigned a new Rx number when refilled electronically. Your pharmacy may indicate that no refills were authorized even though a new prescription for the same medication is available at the pharmacy. Please request the medicine by name with the pharmacy before contacting your provider for a refill.        Your To Do List     Future Labs/Procedures Complete By Expires    FREE THYROXINE   As directed 6/16/2018    HEPATIC FUNCTION PANEL  As directed 6/16/2018    TRIIDOTHYRONINE  As directed 6/16/2018    TSH  As directed 6/16/2018         MyChart Access Code: Activation code not generated  Current "[employer]t Status: Active

## 2017-06-19 ENCOUNTER — HOSPITAL ENCOUNTER (OUTPATIENT)
Dept: LAB | Facility: MEDICAL CENTER | Age: 47
End: 2017-06-19
Attending: FAMILY MEDICINE
Payer: COMMERCIAL

## 2017-06-19 DIAGNOSIS — E03.9 ACQUIRED HYPOTHYROIDISM: ICD-10-CM

## 2017-06-19 DIAGNOSIS — B35.1 ONYCHOMYCOSIS: ICD-10-CM

## 2017-06-19 LAB
ALBUMIN SERPL BCP-MCNC: 4.1 G/DL (ref 3.2–4.9)
ALP SERPL-CCNC: 63 U/L (ref 30–99)
ALT SERPL-CCNC: 16 U/L (ref 2–50)
AST SERPL-CCNC: 18 U/L (ref 12–45)
BILIRUB CONJ SERPL-MCNC: <0.1 MG/DL (ref 0.1–0.5)
BILIRUB INDIRECT SERPL-MCNC: NORMAL MG/DL (ref 0–1)
BILIRUB SERPL-MCNC: 0.3 MG/DL (ref 0.1–1.5)
PROT SERPL-MCNC: 7.1 G/DL (ref 6–8.2)
T3 SERPL-MCNC: 102.2 NG/DL (ref 60–181)
T4 FREE SERPL-MCNC: 0.97 NG/DL (ref 0.53–1.43)
TSH SERPL DL<=0.005 MIU/L-ACNC: 1.1 UIU/ML (ref 0.3–3.7)

## 2017-06-19 PROCEDURE — 84443 ASSAY THYROID STIM HORMONE: CPT

## 2017-06-19 PROCEDURE — 84439 ASSAY OF FREE THYROXINE: CPT

## 2017-06-19 PROCEDURE — 36415 COLL VENOUS BLD VENIPUNCTURE: CPT

## 2017-06-19 PROCEDURE — 84480 ASSAY TRIIODOTHYRONINE (T3): CPT

## 2017-06-19 PROCEDURE — 80076 HEPATIC FUNCTION PANEL: CPT

## 2017-10-11 ENCOUNTER — OFFICE VISIT (OUTPATIENT)
Dept: MEDICAL GROUP | Facility: PHYSICIAN GROUP | Age: 47
End: 2017-10-11
Payer: COMMERCIAL

## 2017-10-11 VITALS
TEMPERATURE: 97.9 F | DIASTOLIC BLOOD PRESSURE: 80 MMHG | HEART RATE: 80 BPM | SYSTOLIC BLOOD PRESSURE: 118 MMHG | RESPIRATION RATE: 14 BRPM | OXYGEN SATURATION: 97 %

## 2017-10-11 DIAGNOSIS — M77.11 LATERAL EPICONDYLITIS OF RIGHT ELBOW: ICD-10-CM

## 2017-10-11 PROCEDURE — 20550 NJX 1 TENDON SHEATH/LIGAMENT: CPT | Performed by: FAMILY MEDICINE

## 2017-10-11 NOTE — PROGRESS NOTES
Chief Complaint   Patient presents with   • Elbow Pain     rt elbow pain       HISTORY OF PRESENT ILLNESS: Patient is a 47 y.o. female established patient here today for the following concerns:    1. Lateral epicondylitis of right elbow  2 months of right elbow pain, laterally with radiation down to the right wrist.  No swelling or redness noted.  Worse with any gripping motion.  Not relieved with bracing, ice, or ibuprofen.       Past Medical, Social, and Family history reviewed and updated in EPIC    Allergies:Erythromycin    Current Outpatient Prescriptions   Medication Sig Dispense Refill   • lisdexamfetamine (VYVANSE) 70 MG capsule Take 70 mg by mouth every morning.     • Diclofenac Sodium (VOLTAREN) 1 % Gel Apply 2 g to skin as directed 3 times a day as needed. 1 Tube 3   • aspirin (ASA) 325 MG Tab Take 325 mg by mouth every 6 hours as needed.     • neomycin-polymyxin-HC (PEDIOTIC HC) 3.5-87918-3 Suspension Place 4 Drops in ear 3 times a day. 1 Bottle 1   • liothyronine (CYTOMEL) 5 MCG Tab TAKE 1 TABLET TWICE A  Tab 3   • levothyroxine (SYNTHROID) 88 MCG Tab TAKE 1 TABLET DAILY 90 Tab 3   • ibuprofen (MOTRIN) 200 MG Tab Take 1 Tab by mouth every 6 hours as needed for Mild Pain, Fever, Headache or Inflammation. 270 Tab 3   • acetaminophen (TYLENOL) 325 MG Tab Take 2 Tabs by mouth every four hours as needed for Mild Pain, Moderate Pain or Fever. 270 Tab 3   • Glucos-Chondroit-Hyaluron-MSM (GLUCOSAMINE CHONDROITIN JOINT PO) Take  by mouth.     • VITAMIN D, ERGOCALCIFEROL, PO Take  by mouth every day.       • Multiple Vitamin (MULTIVITAMIN PO) Take  by mouth every day.       • Calcium Carbonate-Vitamin D (CALCIUM + D PO) Take  by mouth every day.       • loratadine (CLARITIN) 10 MG Tab Take 1 Tab by mouth every day. 90 Tab 3   • DM-Doxylamine-Acetaminophen 15-6. MG/15ML Liquid Take 15 mL by mouth 2 times a day as needed. 1 Bottle 11   • amphetamine-dextroamphetamine (ADDERALL) 10 MG Tab      •  lisdexamfetamine (VYVANSE) 50 MG capsule Take 50 mg by mouth every morning.     • cyclobenzaprine (FLEXERIL) 5 MG tablet Take 1 Tab by mouth 3 times a day as needed for Moderate Pain or Severe Pain. 30 Tab 0   • amoxicillin-clavulanate (AUGMENTIN) 875-125 MG Tab Take 1 Tab by mouth 2 times a day. 20 Tab 0   • Lisdexamfetamine Dimesylate 60 MG Cap Take 60 mg by mouth every day.     • ibuprofen (MOTRIN) 200 MG Tab Take 600 mg by mouth every 6 hours as needed.     • Lisdexamfetamine Dimesylate 60 MG Cap Take 60 mg by mouth every day.       No current facility-administered medications for this visit.          ROS:  Review of Systems   Constitutional: Negative for fever, chills, weight loss and malaise/fatigue.   HENT: Negative for ear pain, nosebleeds, congestion, sore throat and neck pain.    Eyes: Negative for blurred vision.   Respiratory: Negative for cough, sputum production, shortness of breath and wheezing.    Cardiovascular: Negative for chest pain, palpitations,  and leg swelling.   Gastrointestinal: Negative for heartburn, nausea, vomiting, diarrhea and abdominal pain.   Genitourinary: Negative for dysuria, urgency and frequency.   Musculoskeletal: Negative for myalgias, back pain and joint pain.   Skin: Negative for rash and itching.   Neurological: Negative for dizziness, tingling, tremors, sensory change, focal weakness and headaches.   Endo/Heme/Allergies: Does not bruise/bleed easily.   Psychiatric/Behavioral: Negative for depression, anxiety, suicidal ideas, insomnia and memory loss.      Exam:  Blood pressure 118/80, pulse 80, temperature 36.6 °C (97.9 °F), resp. rate 14, SpO2 97 %.    General:  Well nourished, well developed in NAD  Head is grossly normal.  Neck: Supple without JVD   Pulmonary:  Normal effort.   Cardiovascular: Regular rate  Extremities: no clubbing, cyanosis, or edema.  Psych: affect appropriate  MSK: point tenderness over the lateral epicondyle    Please note that this dictation was  created using voice recognition software. I have made every reasonable attempt to correct obvious errors, but I expect that there are errors of grammar and possibly content that I did not discover before finalizing the note.    Assessment/Plan:  1. Lateral epicondylitis of right elbow  A steroid injection was performed at right lateral epicondyle using 1% plain Lidocaine and 12.5 mg of Kenalog. This was well tolerated.  Follow up if not improving.

## 2018-02-02 ENCOUNTER — TELEPHONE (OUTPATIENT)
Dept: MEDICAL GROUP | Facility: PHYSICIAN GROUP | Age: 48
End: 2018-02-02

## 2018-02-02 DIAGNOSIS — H91.93 BILATERAL HEARING LOSS, UNSPECIFIED HEARING LOSS TYPE: ICD-10-CM

## 2018-02-03 NOTE — TELEPHONE ENCOUNTER
Please advise I am not sure what type of referral this patient needs         Rosaura Joseph M.D. 3 minutes ago (5:44 PM)         They might have some insight on the itchy ears.  More importantly, however, is that I would like to have an audiological evaluation done to see what kind/how much of a hearing loss I have.          You   Rosaura Cintron 7 hours ago (10:25 AM)         I don't think that place would treat you for your itchy ears you would have to go to an ENT          Rosaura Joseph M.D. 7 hours ago (10:08 AM)         Corrie Joseph,   Could you send a referral to [facility] for me to have an audiological evaluation. I've been questioning my hearing for a couple of years and would like to see how it is. Also, see if they have any ideas how to help my nonstop itchy ears that I repeatedly get infected.   Thank you!   Rosaura Cintron

## 2018-02-07 ENCOUNTER — PATIENT MESSAGE (OUTPATIENT)
Dept: ADMINISTRATIVE | Facility: MEDICAL CENTER | Age: 48
End: 2018-02-07

## 2018-02-07 DIAGNOSIS — H91.93 BILATERAL HEARING LOSS, UNSPECIFIED HEARING LOSS TYPE: ICD-10-CM

## 2018-02-13 DIAGNOSIS — H91.90 HEARING DISORDER, UNSPECIFIED LATERALITY: ICD-10-CM

## 2018-02-21 DIAGNOSIS — H60.502 ACUTE OTITIS EXTERNA OF LEFT EAR, UNSPECIFIED TYPE: ICD-10-CM

## 2018-02-21 DIAGNOSIS — E03.8 OTHER SPECIFIED HYPOTHYROIDISM: ICD-10-CM

## 2018-02-21 NOTE — TELEPHONE ENCOUNTER
Was the patient seen in the last year in this department? Yes     Does patient have an active prescription for medications requested? No     Received Request Via: Pharmacy      Pt met protocol?: Yes pt last ov 10/17   TSH   Date Value Ref Range Status   06/19/2017 1.100 0.300 - 3.700 uIU/mL Final       Free T-4   Date Value Ref Range Status   06/19/2017 0.97 0.53 - 1.43 ng/dL Final     Free Thyroxin Index -Fti   Date Value Ref Range Status   06/14/2011 3.3 1.7 - 4.2 units Final

## 2018-02-22 RX ORDER — NEOMYCIN SULFATE, POLYMYXIN B SULFATE AND HYDROCORTISONE 10; 3.5; 1 MG/ML; MG/ML; [USP'U]/ML
4 SUSPENSION/ DROPS AURICULAR (OTIC) 3 TIMES DAILY
Qty: 1 BOTTLE | Refills: 0 | Status: SHIPPED | OUTPATIENT
Start: 2018-02-22 | End: 2020-10-29

## 2018-02-22 RX ORDER — LEVOTHYROXINE SODIUM 88 UG/1
88 TABLET ORAL
Qty: 90 TAB | Refills: 0 | Status: SHIPPED | OUTPATIENT
Start: 2018-02-22 | End: 2018-07-26 | Stop reason: SDUPTHER

## 2018-03-25 ENCOUNTER — PATIENT MESSAGE (OUTPATIENT)
Dept: MEDICAL GROUP | Facility: PHYSICIAN GROUP | Age: 48
End: 2018-03-25

## 2018-03-25 DIAGNOSIS — B35.1 ONYCHOMYCOSIS: ICD-10-CM

## 2018-03-26 NOTE — TELEPHONE ENCOUNTER
Received fax from [facility] requesting change from Lamisil to Sporanox 200 mg take po qd x 12 weeks.

## 2018-03-27 RX ORDER — ITRACONAZOLE 100 MG/1
200 CAPSULE ORAL DAILY
Qty: 180 CAP | Refills: 0 | Status: SHIPPED | OUTPATIENT
Start: 2018-03-27 | End: 2019-10-29 | Stop reason: SDUPTHER

## 2018-06-25 ENCOUNTER — TELEPHONE (OUTPATIENT)
Dept: MEDICAL GROUP | Facility: PHYSICIAN GROUP | Age: 48
End: 2018-06-25

## 2018-06-25 DIAGNOSIS — E78.2 MIXED HYPERLIPIDEMIA: ICD-10-CM

## 2018-06-25 DIAGNOSIS — E03.8 OTHER SPECIFIED HYPOTHYROIDISM: ICD-10-CM

## 2018-06-25 NOTE — TELEPHONE ENCOUNTER
Was the patient seen in the last year in this department? Yes 10/11/2017    Does patient have an active prescription for medications requested? No     [facility]

## 2018-06-26 RX ORDER — LIOTHYRONINE SODIUM 5 UG/1
TABLET ORAL
Qty: 180 TAB | Refills: 0 | Status: SHIPPED | OUTPATIENT
Start: 2018-06-26 | End: 2019-01-14 | Stop reason: SDUPTHER

## 2018-06-26 NOTE — TELEPHONE ENCOUNTER
Was the patient seen in the last year in this department? Yes     Does patient have an active prescription for medications requested? No     Received Request Via: Pharmacy      Pt met protocol?: Yes    OV  10/17    TSH 6/17

## 2018-07-26 DIAGNOSIS — E03.8 OTHER SPECIFIED HYPOTHYROIDISM: ICD-10-CM

## 2018-07-27 RX ORDER — LEVOTHYROXINE SODIUM 88 UG/1
88 TABLET ORAL
Qty: 90 TAB | Refills: 0 | OUTPATIENT
Start: 2018-07-27

## 2018-08-14 ENCOUNTER — PATIENT MESSAGE (OUTPATIENT)
Dept: MEDICAL GROUP | Facility: PHYSICIAN GROUP | Age: 48
End: 2018-08-14

## 2018-08-14 DIAGNOSIS — C50.919 MALIGNANT NEOPLASM OF FEMALE BREAST, UNSPECIFIED ESTROGEN RECEPTOR STATUS, UNSPECIFIED LATERALITY, UNSPECIFIED SITE OF BREAST (HCC): ICD-10-CM

## 2018-08-15 ENCOUNTER — TELEPHONE (OUTPATIENT)
Dept: MEDICAL GROUP | Facility: PHYSICIAN GROUP | Age: 48
End: 2018-08-15

## 2018-08-15 DIAGNOSIS — S91.115A: ICD-10-CM

## 2018-08-15 NOTE — TELEPHONE ENCOUNTER
Pt was seen in [facility] for Laceration of toe, after the summary they are requesting the Pt to be referred to an orthopedic surgeon.  I have scanned recent office noted into the .   Please Advise

## 2018-08-25 ENCOUNTER — HOSPITAL ENCOUNTER (OUTPATIENT)
Dept: LAB | Facility: MEDICAL CENTER | Age: 48
End: 2018-08-25
Attending: FAMILY MEDICINE
Payer: COMMERCIAL

## 2018-08-25 DIAGNOSIS — C50.919 MALIGNANT NEOPLASM OF FEMALE BREAST, UNSPECIFIED ESTROGEN RECEPTOR STATUS, UNSPECIFIED LATERALITY, UNSPECIFIED SITE OF BREAST (HCC): ICD-10-CM

## 2018-08-25 DIAGNOSIS — E03.8 OTHER SPECIFIED HYPOTHYROIDISM: ICD-10-CM

## 2018-08-25 DIAGNOSIS — E78.2 MIXED HYPERLIPIDEMIA: ICD-10-CM

## 2018-08-25 DIAGNOSIS — B35.1 ONYCHOMYCOSIS: ICD-10-CM

## 2018-08-25 LAB
25(OH)D3 SERPL-MCNC: 24 NG/ML (ref 30–100)
ALBUMIN SERPL BCP-MCNC: 3.8 G/DL (ref 3.2–4.9)
ALBUMIN/GLOB SERPL: 1.2 G/DL
ALP SERPL-CCNC: 59 U/L (ref 30–99)
ALT SERPL-CCNC: 15 U/L (ref 2–50)
ANION GAP SERPL CALC-SCNC: 5 MMOL/L (ref 0–11.9)
AST SERPL-CCNC: 18 U/L (ref 12–45)
BILIRUB CONJ SERPL-MCNC: <0.1 MG/DL (ref 0.1–0.5)
BILIRUB INDIRECT SERPL-MCNC: NORMAL MG/DL (ref 0–1)
BILIRUB SERPL-MCNC: 0.3 MG/DL (ref 0.1–1.5)
BUN SERPL-MCNC: 11 MG/DL (ref 8–22)
CALCIUM SERPL-MCNC: 9.5 MG/DL (ref 8.5–10.5)
CANCER AG125 SERPL-ACNC: 14.7 U/ML (ref 0–35)
CHLORIDE SERPL-SCNC: 104 MMOL/L (ref 96–112)
CHOLEST SERPL-MCNC: 266 MG/DL (ref 100–199)
CO2 SERPL-SCNC: 28 MMOL/L (ref 20–33)
CREAT SERPL-MCNC: 0.73 MG/DL (ref 0.5–1.4)
ERYTHROCYTE [DISTWIDTH] IN BLOOD BY AUTOMATED COUNT: 45.1 FL (ref 35.9–50)
GLOBULIN SER CALC-MCNC: 3.1 G/DL (ref 1.9–3.5)
GLUCOSE SERPL-MCNC: 78 MG/DL (ref 65–99)
HCT VFR BLD AUTO: 43.1 % (ref 37–47)
HDLC SERPL-MCNC: 52 MG/DL
HGB BLD-MCNC: 13.6 G/DL (ref 12–16)
LDLC SERPL CALC-MCNC: 162 MG/DL
MCH RBC QN AUTO: 30.1 PG (ref 27–33)
MCHC RBC AUTO-ENTMCNC: 31.6 G/DL (ref 33.6–35)
MCV RBC AUTO: 95.4 FL (ref 81.4–97.8)
PLATELET # BLD AUTO: 365 K/UL (ref 164–446)
PMV BLD AUTO: 9.8 FL (ref 9–12.9)
POTASSIUM SERPL-SCNC: 4.8 MMOL/L (ref 3.6–5.5)
PROT SERPL-MCNC: 6.9 G/DL (ref 6–8.2)
RBC # BLD AUTO: 4.52 M/UL (ref 4.2–5.4)
SODIUM SERPL-SCNC: 137 MMOL/L (ref 135–145)
T4 FREE SERPL-MCNC: 0.79 NG/DL (ref 0.53–1.43)
TRIGL SERPL-MCNC: 262 MG/DL (ref 0–149)
TSH SERPL DL<=0.005 MIU/L-ACNC: 2.16 UIU/ML (ref 0.38–5.33)
WBC # BLD AUTO: 9.4 K/UL (ref 4.8–10.8)

## 2018-08-25 PROCEDURE — 36415 COLL VENOUS BLD VENIPUNCTURE: CPT

## 2018-08-25 PROCEDURE — 80061 LIPID PANEL: CPT

## 2018-08-25 PROCEDURE — 80053 COMPREHEN METABOLIC PANEL: CPT

## 2018-08-25 PROCEDURE — 84439 ASSAY OF FREE THYROXINE: CPT

## 2018-08-25 PROCEDURE — 86304 IMMUNOASSAY TUMOR CA 125: CPT

## 2018-08-25 PROCEDURE — 82248 BILIRUBIN DIRECT: CPT

## 2018-08-25 PROCEDURE — 84443 ASSAY THYROID STIM HORMONE: CPT

## 2018-08-25 PROCEDURE — 82306 VITAMIN D 25 HYDROXY: CPT

## 2018-08-25 PROCEDURE — 85027 COMPLETE CBC AUTOMATED: CPT

## 2018-10-24 ENCOUNTER — OFFICE VISIT (OUTPATIENT)
Dept: MEDICAL GROUP | Facility: PHYSICIAN GROUP | Age: 48
End: 2018-10-24
Payer: COMMERCIAL

## 2018-10-24 VITALS
DIASTOLIC BLOOD PRESSURE: 82 MMHG | HEART RATE: 92 BPM | BODY MASS INDEX: 30.31 KG/M2 | OXYGEN SATURATION: 97 % | SYSTOLIC BLOOD PRESSURE: 114 MMHG | RESPIRATION RATE: 14 BRPM | WEIGHT: 188.6 LBS | HEIGHT: 66 IN | TEMPERATURE: 97.2 F

## 2018-10-24 DIAGNOSIS — E03.8 OTHER SPECIFIED HYPOTHYROIDISM: ICD-10-CM

## 2018-10-24 DIAGNOSIS — E78.2 MIXED HYPERLIPIDEMIA: ICD-10-CM

## 2018-10-24 DIAGNOSIS — M79.605 PAIN IN BOTH LOWER EXTREMITIES: ICD-10-CM

## 2018-10-24 DIAGNOSIS — M79.604 PAIN IN BOTH LOWER EXTREMITIES: ICD-10-CM

## 2018-10-24 DIAGNOSIS — J06.9 UPPER RESPIRATORY TRACT INFECTION, UNSPECIFIED TYPE: ICD-10-CM

## 2018-10-24 PROCEDURE — 99214 OFFICE O/P EST MOD 30 MIN: CPT | Performed by: FAMILY MEDICINE

## 2018-10-24 RX ORDER — LEVOTHYROXINE SODIUM 0.1 MG/1
100 TABLET ORAL
Qty: 90 TAB | Refills: 3 | Status: SHIPPED | OUTPATIENT
Start: 2018-10-24 | End: 2019-11-04 | Stop reason: SDUPTHER

## 2018-10-24 ASSESSMENT — PATIENT HEALTH QUESTIONNAIRE - PHQ9: CLINICAL INTERPRETATION OF PHQ2 SCORE: 0

## 2018-10-24 NOTE — PROGRESS NOTES
Chief Complaint   Patient presents with   • Results     labs   • Cough     x 1 day       HISTORY OF PRESENT ILLNESS: Patient is a 48 y.o. female established patient here today for the following concerns:    1. Upper respiratory tract infection, unspecified type  Here today with cough, sore throat and hoarseness to the voice for the last day.  No fevers, chills.  Slight headache. No neck pain. Cough is mostly non-productive.  Some PND.     2. Other specified hypothyroidism  Continues on levothyroxine and cytomel, although reports its hard to get the second dose in.  She has been having some hair loss, fatigue.  TSH and Free T4 reviewed.      3. Mixed hyperlipidemia  Lipids reviewed.  Hx of hyperlipidemia.  Has in the past been able to improve them with dietary changes and exercise.      4. Leg pain  Lastly has had intermittent leg pain, right greater than left.  Anterior.  Was sharp stabbing.  Has been going to chiropracter which seems to be helping with that pain considerably  Still notes particularly after wearing slight wedge she has more pain in the legs.  No swelling.  No redness.  Improves with massage and chiropractic.     Past Medical, Social, and Family history reviewed and updated in EPIC    Allergies:Erythromycin    Current Outpatient Prescriptions   Medication Sig Dispense Refill   • levothyroxine (SYNTHROID) 100 MCG Tab Take 1 Tab by mouth Every morning on an empty stomach. 90 Tab 3   • liothyronine (CYTOMEL) 5 MCG Tab TAKE 1 TABLET TWICE A  Tab 0   • neomycin-polymyxin-HC (PEDIOTIC HC) 3.5-43076-0 Suspension Place 4 Drops in ear 3 times a day. 1 Bottle 0   • lisdexamfetamine (VYVANSE) 70 MG capsule Take 70 mg by mouth every morning.     • Glucos-Chondroit-Hyaluron-MSM (GLUCOSAMINE CHONDROITIN JOINT PO) Take  by mouth.     • VITAMIN D, ERGOCALCIFEROL, PO Take  by mouth every day.       • Multiple Vitamin (MULTIVITAMIN PO) Take  by mouth every day.       • Calcium Carbonate-Vitamin D (CALCIUM + D  "PO) Take  by mouth every day.       • Diclofenac Sodium (VOLTAREN) 1 % Gel Apply 2 g to skin as directed 3 times a day as needed. 1 Tube 3   • aspirin (ASA) 325 MG Tab Take 325 mg by mouth every 6 hours as needed.     • ibuprofen (MOTRIN) 200 MG Tab Take 1 Tab by mouth every 6 hours as needed for Mild Pain, Fever, Headache or Inflammation. 270 Tab 3   • acetaminophen (TYLENOL) 325 MG Tab Take 2 Tabs by mouth every four hours as needed for Mild Pain, Moderate Pain or Fever. 270 Tab 3   • amphetamine-dextroamphetamine (ADDERALL) 10 MG Tab      • ibuprofen (MOTRIN) 200 MG Tab Take 600 mg by mouth every 6 hours as needed.       No current facility-administered medications for this visit.          ROS:  Review of Systems   Constitutional: Negative for fever, chills, weight loss and malaise/fatigue.   HENT: Negative for ear pain, nosebleeds, congestion, +sore throat and no neck pain.    Eyes: Negative for blurred vision.   Respiratory: + for cough, no sputum production, shortness of breath and wheezing.    Cardiovascular: Negative for chest pain, palpitations,  and leg swelling.   Gastrointestinal: Negative for heartburn, nausea, vomiting, diarrhea and abdominal pain.   Genitourinary: Negative for dysuria, urgency and frequency.   Musculoskeletal: Negative for myalgias,+ back pain and joint pain.   Skin: Negative for rash and itching.   Neurological: Negative for dizziness, tingling, tremors, sensory change, focal weakness and headaches.   Endo/Heme/Allergies: Does not bruise/bleed easily.   Psychiatric/Behavioral: Negative for depression, anxiety, suicidal ideas, insomnia and memory loss.      Exam:  Blood pressure 114/82, pulse 92, temperature 36.2 °C (97.2 °F), resp. rate 14, height 1.676 m (5' 5.98\"), weight 85.5 kg (188 lb 9.6 oz), SpO2 97 %.    General:  Well nourished, well developed in NAD  Head is grossly normal.  HEENT: Normocephalic and atraumatic. TMs clear bilaterally. Oropharynx is erythematous and no " tonsillar exudate. Nasal mucosa pink with minimal rhinorrhea.  Neck: Supple without JVD   Pulmonary:  Normal effort. CTAB  Cardiovascular: Regular rate and rhythm   Extremities: no clubbing, cyanosis, or edema.  Psych: affect appropriate      Please note that this dictation was created using voice recognition software. I have made every reasonable attempt to correct obvious errors, but I expect that there are errors of grammar and possibly content that I did not discover before finalizing the note.    Assessment/Plan:  1. Upper respiratory tract infection, unspecified type  Supportive care measures.  If not improving in 10-14 days let use know.     2. Other specified hypothyroidism  Continue 5 mg of cytomel, increase synthroid, recheck in 6-8 weeks.   - levothyroxine (SYNTHROID) 100 MCG Tab; Take 1 Tab by mouth Every morning on an empty stomach.  Dispense: 90 Tab; Refill: 3  - TRIIDOTHYRONINE; Future  - FREE THYROXINE; Future  - TSH; Future    3. Mixed hyperlipidemia  TLC for 6 months and recheck   - COMP METABOLIC PANEL; Future  - LIPID PROFILE; Future      4. Leg pain  Suspect lumbar radicular symptoms, continue chiropractic, massage.  May consider adding PT in.

## 2019-01-14 DIAGNOSIS — E03.8 OTHER SPECIFIED HYPOTHYROIDISM: ICD-10-CM

## 2019-01-14 RX ORDER — LIOTHYRONINE SODIUM 5 UG/1
TABLET ORAL
Qty: 180 TAB | Refills: 1 | Status: SHIPPED | OUTPATIENT
Start: 2019-01-14 | End: 2020-03-03 | Stop reason: SDUPTHER

## 2019-01-14 NOTE — TELEPHONE ENCOUNTER
Was the patient seen in the last year in this department? Yes    Does patient have an active prescription for medications requested? No     Received Request Via: Pharmacy      Pt met protocol?: Yes    LAST OV 10/24/2018      Lab Results  Component Value Date/Time   TSHULTRASEN 2.160 08/25/2018 0834

## 2019-02-02 ENCOUNTER — HOSPITAL ENCOUNTER (OUTPATIENT)
Dept: LAB | Facility: MEDICAL CENTER | Age: 49
End: 2019-02-02
Attending: FAMILY MEDICINE
Payer: COMMERCIAL

## 2019-02-02 DIAGNOSIS — E78.2 MIXED HYPERLIPIDEMIA: ICD-10-CM

## 2019-02-02 DIAGNOSIS — E03.8 OTHER SPECIFIED HYPOTHYROIDISM: ICD-10-CM

## 2019-02-02 LAB
ALBUMIN SERPL BCP-MCNC: 4 G/DL (ref 3.2–4.9)
ALBUMIN/GLOB SERPL: 1.5 G/DL
ALP SERPL-CCNC: 52 U/L (ref 30–99)
ALT SERPL-CCNC: 21 U/L (ref 2–50)
ANION GAP SERPL CALC-SCNC: 6 MMOL/L (ref 0–11.9)
AST SERPL-CCNC: 21 U/L (ref 12–45)
BILIRUB SERPL-MCNC: 0.4 MG/DL (ref 0.1–1.5)
BUN SERPL-MCNC: 13 MG/DL (ref 8–22)
CALCIUM SERPL-MCNC: 9.2 MG/DL (ref 8.5–10.5)
CHLORIDE SERPL-SCNC: 107 MMOL/L (ref 96–112)
CHOLEST SERPL-MCNC: 154 MG/DL (ref 100–199)
CO2 SERPL-SCNC: 27 MMOL/L (ref 20–33)
CREAT SERPL-MCNC: 0.83 MG/DL (ref 0.5–1.4)
FASTING STATUS PATIENT QL REPORTED: NORMAL
GLOBULIN SER CALC-MCNC: 2.7 G/DL (ref 1.9–3.5)
GLUCOSE SERPL-MCNC: 98 MG/DL (ref 65–99)
HDLC SERPL-MCNC: 45 MG/DL
LDLC SERPL CALC-MCNC: 88 MG/DL
POTASSIUM SERPL-SCNC: 4.8 MMOL/L (ref 3.6–5.5)
PROT SERPL-MCNC: 6.7 G/DL (ref 6–8.2)
SODIUM SERPL-SCNC: 140 MMOL/L (ref 135–145)
T3 SERPL-MCNC: 99.8 NG/DL (ref 60–181)
T4 FREE SERPL-MCNC: 0.86 NG/DL (ref 0.53–1.43)
TRIGL SERPL-MCNC: 104 MG/DL (ref 0–149)
TSH SERPL DL<=0.005 MIU/L-ACNC: 1.3 UIU/ML (ref 0.38–5.33)

## 2019-02-02 PROCEDURE — 84443 ASSAY THYROID STIM HORMONE: CPT

## 2019-02-02 PROCEDURE — 36415 COLL VENOUS BLD VENIPUNCTURE: CPT

## 2019-02-02 PROCEDURE — 80061 LIPID PANEL: CPT

## 2019-02-02 PROCEDURE — 84480 ASSAY TRIIODOTHYRONINE (T3): CPT

## 2019-02-02 PROCEDURE — 84439 ASSAY OF FREE THYROXINE: CPT

## 2019-02-02 PROCEDURE — 80053 COMPREHEN METABOLIC PANEL: CPT

## 2019-03-06 NOTE — TELEPHONE ENCOUNTER
refill already sent to pharmacy      Detail Level: Simple Render Post-Care Instructions In Note?: no Duration Of Freeze Thaw-Cycle (Seconds): 0 Post-Care Instructions: I reviewed with the patient in detail post-care instructions. Patient is to wear sunprotection, and avoid picking at any of the treated lesions. Pt may apply Vaseline to crusted or scabbing areas. Consent: The patient's consent was obtained including but not limited to risks of crusting, scabbing, blistering, scarring, darker or lighter pigmentary change, recurrence, incomplete removal and infection.

## 2019-05-20 ENCOUNTER — APPOINTMENT (RX ONLY)
Dept: URBAN - METROPOLITAN AREA CLINIC 35 | Facility: CLINIC | Age: 49
Setting detail: DERMATOLOGY
End: 2019-05-20

## 2019-05-20 VITALS — HEIGHT: 66 IN | WEIGHT: 171 LBS

## 2019-05-20 DIAGNOSIS — L82.1 OTHER SEBORRHEIC KERATOSIS: ICD-10-CM

## 2019-05-20 PROBLEM — J30.1 ALLERGIC RHINITIS DUE TO POLLEN: Status: ACTIVE | Noted: 2019-05-20

## 2019-05-20 PROCEDURE — 99213 OFFICE O/P EST LOW 20 MIN: CPT

## 2019-05-20 PROCEDURE — ? COUNSELING

## 2019-05-20 PROCEDURE — ? BENIGN DESTRUCTION

## 2019-05-20 ASSESSMENT — LOCATION ZONE DERM
LOCATION ZONE: HAND
LOCATION ZONE: SCALP

## 2019-05-20 ASSESSMENT — LOCATION SIMPLE DESCRIPTION DERM
LOCATION SIMPLE: SCALP
LOCATION SIMPLE: LEFT HAND

## 2019-05-20 ASSESSMENT — LOCATION DETAILED DESCRIPTION DERM
LOCATION DETAILED: LEFT SUPERIOR PARIETAL SCALP
LOCATION DETAILED: RIGHT CENTRAL FRONTAL SCALP
LOCATION DETAILED: LEFT RADIAL DORSAL HAND

## 2019-05-20 NOTE — PROCEDURE: BENIGN DESTRUCTION
Include Z78.9 (Other Specified Conditions Influencing Health Status) As An Associated Diagnosis?: No
Consent: The patient's consent was obtained including but not limited to risks of crusting, scabbing, blistering, scarring, darker or lighter pigmentary change, recurrence, incomplete removal and infection.
Medical Necessity Information: It is in your best interest to select a reason for this procedure from the list below. All of these items fulfill various CMS LCD requirements except the new and changing color options.
Anesthesia Volume In Cc: 0
Post-Care Instructions: I reviewed with the patient in detail post-care instructions. Patient is to wear sunprotection, and avoid picking at any of the treated lesions. Pt may apply Vaseline to crusted or scabbing areas.
Detail Level: Detailed
Medical Necessity Clause: This procedure was medically necessary because the lesions that were treated were:

## 2019-06-17 ENCOUNTER — APPOINTMENT (RX ONLY)
Dept: URBAN - METROPOLITAN AREA CLINIC 35 | Facility: CLINIC | Age: 49
Setting detail: DERMATOLOGY
End: 2019-06-17

## 2019-06-17 DIAGNOSIS — L57.3 POIKILODERMA OF CIVATTE: ICD-10-CM

## 2019-06-17 DIAGNOSIS — B35.1 TINEA UNGUIUM: ICD-10-CM

## 2019-06-17 DIAGNOSIS — L82.1 OTHER SEBORRHEIC KERATOSIS: ICD-10-CM

## 2019-06-17 DIAGNOSIS — L81.4 OTHER MELANIN HYPERPIGMENTATION: ICD-10-CM

## 2019-06-17 DIAGNOSIS — L01.00 IMPETIGO, UNSPECIFIED: ICD-10-CM

## 2019-06-17 DIAGNOSIS — Z71.89 OTHER SPECIFIED COUNSELING: ICD-10-CM

## 2019-06-17 PROCEDURE — ? COUNSELING

## 2019-06-17 PROCEDURE — ? PRESCRIPTION

## 2019-06-17 PROCEDURE — ? ADDITIONAL NOTES

## 2019-06-17 PROCEDURE — ? OBSERVATION AND MEASURE

## 2019-06-17 PROCEDURE — 99214 OFFICE O/P EST MOD 30 MIN: CPT

## 2019-06-17 RX ORDER — ECONAZOLE NITRATE 10 MG/G
1 CREAM TOPICAL BID
Qty: 1 | Refills: 5 | Status: ERX | COMMUNITY
Start: 2019-06-17

## 2019-06-17 RX ADMIN — ECONAZOLE NITRATE 1: 10 CREAM TOPICAL at 00:00

## 2019-06-17 ASSESSMENT — LOCATION DETAILED DESCRIPTION DERM
LOCATION DETAILED: EPIGASTRIC SKIN
LOCATION DETAILED: RIGHT CENTRAL LATERAL NECK
LOCATION DETAILED: RIGHT GREAT TOENAIL
LOCATION DETAILED: LEFT NARIS
LOCATION DETAILED: LEFT GREAT TOENAIL
LOCATION DETAILED: RIGHT LATERAL SUPERIOR CHEST
LOCATION DETAILED: LEFT MEDIAL UPPER BACK

## 2019-06-17 ASSESSMENT — LOCATION ZONE DERM
LOCATION ZONE: NECK
LOCATION ZONE: TRUNK
LOCATION ZONE: NOSE
LOCATION ZONE: TOENAIL

## 2019-06-17 ASSESSMENT — LOCATION SIMPLE DESCRIPTION DERM
LOCATION SIMPLE: LEFT UPPER BACK
LOCATION SIMPLE: RIGHT GREAT TOE
LOCATION SIMPLE: LEFT NOSE
LOCATION SIMPLE: NECK
LOCATION SIMPLE: CHEST
LOCATION SIMPLE: LEFT GREAT TOE
LOCATION SIMPLE: ABDOMEN

## 2019-06-17 NOTE — PROCEDURE: OBSERVATION
Detail Level: Detailed
Morphology Per Location (Optional): patch of hyperpigmentation with surrounding tank and hypo pigmented macules
Size Of Lesion: 1.3 cm

## 2019-06-17 NOTE — PROCEDURE: ADDITIONAL NOTES
Detail Level: Zone
Additional Notes: Lab requisition provided for Hepatic Function Panel.\\nAdvised to grow and clip nails, will send for Fungal Culture And PAS.
Additional Notes: Samples provided for Xepi 1% cream apply in nose twice daily. Hx of + staph infection, not resolved with mupirocin

## 2019-07-21 ENCOUNTER — OFFICE VISIT (OUTPATIENT)
Dept: URGENT CARE | Facility: PHYSICIAN GROUP | Age: 49
End: 2019-07-21
Payer: COMMERCIAL

## 2019-07-21 VITALS
DIASTOLIC BLOOD PRESSURE: 76 MMHG | HEIGHT: 66 IN | SYSTOLIC BLOOD PRESSURE: 117 MMHG | BODY MASS INDEX: 27.32 KG/M2 | OXYGEN SATURATION: 97 % | HEART RATE: 73 BPM | WEIGHT: 170 LBS | TEMPERATURE: 98.3 F | RESPIRATION RATE: 16 BRPM

## 2019-07-21 DIAGNOSIS — S61.214A LACERATION OF RIGHT RING FINGER WITHOUT FOREIGN BODY WITHOUT DAMAGE TO NAIL, INITIAL ENCOUNTER: Primary | ICD-10-CM

## 2019-07-21 PROCEDURE — 12001 RPR S/N/AX/GEN/TRNK 2.5CM/<: CPT | Performed by: NURSE PRACTITIONER

## 2019-07-21 ASSESSMENT — ENCOUNTER SYMPTOMS
TINGLING: 0
MUSCULOSKELETAL NEGATIVE: 1
SENSORY CHANGE: 0
NEUROLOGICAL NEGATIVE: 1
CONSTITUTIONAL NEGATIVE: 1
FOCAL WEAKNESS: 0

## 2019-07-21 NOTE — PROGRESS NOTES
Subjective:     Rosaura Cintron is a 49 y.o. female who presents for Laceration (cut R ring finger on glass window x 1 hour)       Laceration    The incident occurred 1 to 3 hours ago.     Patient presents to urgent care today with reports that she accidentally cut her palmar aspect of her distal right ring finger against some glass.  She states her tetanus was updated last summer.  She said the bleeding would not stop but it is now controlled.  Denies numbness, tingling, loss of function of her ring finger.    PMH:  has a past medical history of ADD (attention deficit disorder with hyperactivity); Anesthesia; Cancer (HCC) (2006); Disorder of thyroid; Invasive ductal carcinoma of breast (HCC) (2006); and Pain.    MEDS:   Current Outpatient Prescriptions:   •  liothyronine (CYTOMEL) 5 MCG Tab, TAKE 1 TABLET TWICE A DAY, Disp: 180 Tab, Rfl: 1  •  levothyroxine (SYNTHROID) 100 MCG Tab, Take 1 Tab by mouth Every morning on an empty stomach., Disp: 90 Tab, Rfl: 3  •  lisdexamfetamine (VYVANSE) 70 MG capsule, Take 70 mg by mouth every morning., Disp: , Rfl:   •  VITAMIN D, ERGOCALCIFEROL, PO, Take  by mouth every day.  , Disp: , Rfl:   •  Multiple Vitamin (MULTIVITAMIN PO), Take  by mouth every day.  , Disp: , Rfl:   •  Calcium Carbonate-Vitamin D (CALCIUM + D PO), Take  by mouth every day.  , Disp: , Rfl:   •  neomycin-polymyxin-HC (PEDIOTIC HC) 3.5-53065-1 Suspension, Place 4 Drops in ear 3 times a day., Disp: 1 Bottle, Rfl: 0  •  Diclofenac Sodium (VOLTAREN) 1 % Gel, Apply 2 g to skin as directed 3 times a day as needed., Disp: 1 Tube, Rfl: 3  •  aspirin (ASA) 325 MG Tab, Take 325 mg by mouth every 6 hours as needed., Disp: , Rfl:   •  ibuprofen (MOTRIN) 200 MG Tab, Take 1 Tab by mouth every 6 hours as needed for Mild Pain, Fever, Headache or Inflammation., Disp: 270 Tab, Rfl: 3  •  acetaminophen (TYLENOL) 325 MG Tab, Take 2 Tabs by mouth every four hours as needed for Mild Pain, Moderate Pain or Fever., Disp: 270 Tab,  Rfl: 3  •  amphetamine-dextroamphetamine (ADDERALL) 10 MG Tab, , Disp: , Rfl:   •  ibuprofen (MOTRIN) 200 MG Tab, Take 600 mg by mouth every 6 hours as needed., Disp: , Rfl:   •  Glucos-Chondroit-Hyaluron-MSM (GLUCOSAMINE CHONDROITIN JOINT PO), Take  by mouth., Disp: , Rfl:     ALLERGIES:   Allergies   Allergen Reactions   • Erythromycin Anaphylaxis and Swelling     Throat     SURGHX:   Past Surgical History:   Procedure Laterality Date   • BREAST IMPLANT REVISION Bilateral 5/26/2016    Procedure: BREAST IMPLANT FOR EXCHANGE WITH PLACEMENT OF TEXTURED ROUND IMPLANTS;  Surgeon: Jeremiah Soria M.D.;  Location: SURGERY SAME DAY [facility];  Service:    • BREAST RECONSTRUCTION Bilateral 5/26/2016    Procedure: BREAST RECONSTRUCTION FAT GRAFTING;  Surgeon: Jeremiah Soria M.D.;  Location: SURGERY SAME DAY [facility];  Service:    • NIPPLE RECONSTRUCTION Bilateral 5/26/2016    Procedure: NIPPLE RECONSTRUCTION AREOLAR;  Surgeon: Jeremiah Soria M.D.;  Location: SURGERY SAME DAY [facility];  Service:    • FULL THICKNESS SKIN GRAFT  5/26/2016    Procedure: FULL THICKNESS SKIN GRAFT;  Surgeon: Jeremiah oSria M.D.;  Location: SURGERY SAME DAY [facility];  Service:    • CAPSULOTOMY  5/26/2016    Procedure: CAPSULOTOMY FOR CAPSULECTOMIES WITH CAPSULORRHAPHY USE OF ACELLULAR DERMAL MATRIX ON THE LEFT TO SUPPORT THE INFRAMAMMARY FOLD;  Surgeon: Jeremiah Soria M.D.;  Location: SURGERY SAME DAY [facility];  Service:    • HYSTERECTOMY ROBOTIC  10/30/2012    Performed by Vivien Rajput M.D. at SURGERY [facility]   • CYSTOSCOPY  10/30/2012    Performed by Vivien Rajput M.D. at SURGERY [facility]   • SALPINGECTOMY  10/30/2012    Performed by Vivien Rajput M.D. at SURGERY [facility]   • LYSIS ADHESIONS GYN  10/30/2012    Performed by Vivien Rajput M.D. at SURGERY [facility]   • NIPPLE RECONSTRUCTION  11/15/2011    Performed by XENA DIA at [facility]  "  • CERVICAL CONIZATION  3/29/2010    Performed by LAKESHA CASAS at SURGERY SAME DAY [facility]   • PB ENDOMETRIAL ABLATION, THERMAL  11/2009   • BREAST IMPLANT REMOVAL  6/18/2009    Performed by XENA DIA at SURGERY [facility]   • BREAST IMPLANT REVISION  6/18/2009    Performed by XENA DIA at SURGERY [facility]   • CAPSULECTOMY  6/18/2009    Performed by XENA DIA at SURGERY [facility]   • BLADDER SLING FEMALE  3/20/2009    Performed by SCOTT FONTENOT at SURGERY SAME DAY [facility]   • CYSTOSCOPY  3/20/2009    Performed by SCOTT FONTENOT at SURGERY SAME DAY [facility]   • BREAST RECONSTRUCTION  3/08   • MASTECTOMY  12/20/07    Right   • MASTECTOMY  11/16/06    Left   • BUNIONECTOMY  04/05    Left   • CARPAL TUNNEL RELEASE  03/05    bilateral   • EYE SURGERY  01/03    Lasik OU   • TONSILLECTOMY AND ADENOIDECTOMY  03/92     SOCHX:  reports that she has never smoked. She has never used smokeless tobacco. She reports that she drinks alcohol. She reports that she does not use drugs.     FH: Reviewed with patient, not pertinent to this visit.    Review of Systems   Constitutional: Negative.    Musculoskeletal: Negative.    Skin:        Laceration R ring finger   Neurological: Negative.  Negative for tingling, sensory change and focal weakness.   All other systems reviewed and are negative.    Objective:     /76 (BP Location: Left arm, Patient Position: Sitting, BP Cuff Size: Adult)   Pulse 73   Temp 36.8 °C (98.3 °F) (Temporal)   Resp 16   Ht 1.676 m (5' 6\")   Wt 77.1 kg (170 lb)   SpO2 97%   BMI 27.44 kg/m²     Physical Exam   Constitutional: She is oriented to person, place, and time. She appears well-developed and well-nourished.  Non-toxic appearance. No distress.   HENT:   Right Ear: External ear normal.   Left Ear: External ear normal.   Nose: Nose normal.   Eyes: Conjunctivae and EOM are normal.   Neck: Normal range of motion. "   Cardiovascular: Normal rate and normal pulses.    Pulmonary/Chest: Effort normal. No respiratory distress.   Musculoskeletal: Normal range of motion. She exhibits no deformity.        Right hand: She exhibits laceration. She exhibits normal range of motion, no tenderness, normal capillary refill, no deformity and no swelling. Normal sensation noted. Normal strength noted.   Approx 1 cm straight laceration to palmar aspect of distal R ring finger   Neurological: She is alert and oriented to person, place, and time. Coordination normal.   Skin: Skin is warm and dry. Capillary refill takes less than 2 seconds.   Psychiatric: She has a normal mood and affect. Her behavior is normal.        Assessment/Plan:     1. Laceration of right ring finger without foreign body without damage to nail, initial encounter    Procedure: Laceration Repair of R Ring Finger  - Risks, benefits, and alternatives reviewed. Risks including bleeding, nerve damage, infection, and poor cosmetic outcome discussed at length.  - Verbal consent was received from patient to proceed with laceration repair.  - Wound length 1 cm, location palmar aspect of distal R ring finger, straight laceration, subcutaneous tissue visible, NVI, no signs of tendon injury.  - Area copiously irrigated with NS, wound explored, no foreign bodies identified.  - Site prepared with Betadine.  - Sterile technique throughout.  - Applied 2 layers of Dermabond with good wound edge approximation.  - Minimal bleeding with good hemostasis achieved.   - Non-adhesive dressing and finger splint applied.  - There were no procedural complications.  - Patient tolerated procedure well.    - Last tetanus booster was last summer per patient.    Patient advised to monitor for signs of infection including, but not limited to, increased redness, warmth, pain, swelling, discharge, or fever. Wound care instructions provided.    Advised to avoid getting ointments or oily substances on the  repair. Advised to keep dressing and site clean and dry. May briefly wet while showering, but avoid soaking in water.    Discussed close monitoring. May use OTC symptom management including acetaminophen and/or ibuprofen PRN pain and/or fever.    Patient advised to: Return for 1) Symptoms don't improve or worsen, or go to ER, 2) Follow up with primary care in 7-10 days.    Differential diagnosis, natural history, supportive care, and indications for immediate follow-up discussed. All questions answered. Patient agrees with the plan of care.

## 2019-08-21 ENCOUNTER — OFFICE VISIT (OUTPATIENT)
Dept: MEDICAL GROUP | Facility: PHYSICIAN GROUP | Age: 49
End: 2019-08-21
Payer: COMMERCIAL

## 2019-08-21 VITALS
BODY MASS INDEX: 27.16 KG/M2 | TEMPERATURE: 98.7 F | OXYGEN SATURATION: 98 % | SYSTOLIC BLOOD PRESSURE: 130 MMHG | WEIGHT: 169 LBS | RESPIRATION RATE: 16 BRPM | DIASTOLIC BLOOD PRESSURE: 88 MMHG | HEART RATE: 70 BPM | HEIGHT: 66 IN

## 2019-08-21 DIAGNOSIS — N64.4 BREAST PAIN: ICD-10-CM

## 2019-08-21 DIAGNOSIS — Z98.82 BREAST IMPLANT STATUS: ICD-10-CM

## 2019-08-21 DIAGNOSIS — M19.042 PRIMARY OSTEOARTHRITIS OF BOTH HANDS: ICD-10-CM

## 2019-08-21 DIAGNOSIS — Z90.13 S/P MASTECTOMY, BILATERAL: ICD-10-CM

## 2019-08-21 DIAGNOSIS — M19.041 PRIMARY OSTEOARTHRITIS OF BOTH HANDS: ICD-10-CM

## 2019-08-21 PROCEDURE — 99214 OFFICE O/P EST MOD 30 MIN: CPT | Performed by: FAMILY MEDICINE

## 2019-08-21 ASSESSMENT — PATIENT HEALTH QUESTIONNAIRE - PHQ9: CLINICAL INTERPRETATION OF PHQ2 SCORE: 0

## 2019-08-21 NOTE — PROGRESS NOTES
Chief Complaint   Patient presents with   • Breast Pain     rt breast pain x 6 mo   • Hand Pain     bilateral hand pain       HISTORY OF PRESENT ILLNESS: Patient is a 49 y.o. female established patient here today for the following concerns:    1. Breast pain  2. S/P mastectomy, bilateral  3. Breast implant status  Pleasant 49 year old female with hx of invasive ductal carcinoma that was ER/CA positive approximately 16 years ago, treated with bilateral mastectomy and tamoxifen for 5 years. BRCA mutation was negative.  She had hysterecomty for AUB with benign pathology, ovaries remain.  She reports in the last 6 months has been noting some right lateral breast pain and burning. She is concerned that maybe there is leakage of the silicone from the implant or scar tissue on that side.  She has attempted to massage the area without improvement.  Pain comes and goes.  Not associated with any overlying rash or skin changes.  Does not note any contour change of the reconstructed breast.  No axillary lumps noted.      4. Primary osteoarthritis of both hands  Reports worsening bilateral DIP pain particularly in the index fingers and thumbs.  Wondering if there is any treatment options.        Past Medical, Social, and Family history reviewed and updated in EPIC    Allergies:Erythromycin    Current Outpatient Medications   Medication Sig Dispense Refill   • Non Formulary Request   5   • liothyronine (CYTOMEL) 5 MCG Tab TAKE 1 TABLET TWICE A  Tab 1   • levothyroxine (SYNTHROID) 100 MCG Tab Take 1 Tab by mouth Every morning on an empty stomach. 90 Tab 3   • neomycin-polymyxin-HC (PEDIOTIC HC) 3.5-81842-7 Suspension Place 4 Drops in ear 3 times a day. 1 Bottle 0   • lisdexamfetamine (VYVANSE) 70 MG capsule Take 70 mg by mouth every morning.     • Diclofenac Sodium (VOLTAREN) 1 % Gel Apply 2 g to skin as directed 3 times a day as needed. 1 Tube 3   • acetaminophen (TYLENOL) 325 MG Tab Take 2 Tabs by mouth every four hours  "as needed for Mild Pain, Moderate Pain or Fever. 270 Tab 3   • ibuprofen (MOTRIN) 200 MG Tab Take 600 mg by mouth every 6 hours as needed.     • VITAMIN D, ERGOCALCIFEROL, PO Take  by mouth every day.       • Multiple Vitamin (MULTIVITAMIN PO) Take  by mouth every day.       • Calcium Carbonate-Vitamin D (CALCIUM + D PO) Take  by mouth every day.         No current facility-administered medications for this visit.          ROS:  Review of Systems   Constitutional: Negative for fever, chills, weight loss and malaise/fatigue.   HENT: Negative for ear pain, nosebleeds, congestion, sore throat and neck pain.    Eyes: Negative for blurred vision.   Respiratory: Negative for cough, sputum production, shortness of breath and wheezing.    Cardiovascular: Negative for chest pain, palpitations,  and leg swelling.   Gastrointestinal: Negative for heartburn, nausea, vomiting, diarrhea and abdominal pain.   Genitourinary: Negative for dysuria, urgency and frequency.   Musculoskeletal: Negative for myalgias, back pain and joint pain.   Skin: Negative for rash and itching.   Neurological: Negative for dizziness, tingling, tremors, sensory change, focal weakness and headaches.   Endo/Heme/Allergies: Does not bruise/bleed easily.   Psychiatric/Behavioral: Negative for depression, anxiety, suicidal ideas, insomnia and memory loss.      Exam:  /88 (BP Location: Left arm, Patient Position: Sitting, BP Cuff Size: Adult)   Pulse 70   Temp 37.1 °C (98.7 °F)   Resp 16   Ht 1.676 m (5' 6\")   Wt 76.7 kg (169 lb)   SpO2 98%     General:  Well nourished, well developed in NAD  Head is grossly normal.  Neck: Supple without JVD   Pulmonary:  Normal effort.   Cardiovascular: Regular rate  Extremities: no clubbing, cyanosis, or edema.  Psych: affect appropriate  MSK: some deformity noted over the DIP joint of the right index finger.  No redness or warmth.    Breast: right breast with some texture change in the lateral aspect/edge of " the implant.  No descrete mass or LAD appreciated.  No overlying skin changes.     Please note that this dictation was created using voice recognition software. I have made every reasonable attempt to correct obvious errors, but I expect that there are errors of grammar and possibly content that I did not discover before finalizing the note.    Assessment/Plan:  1. Breast pain  - US-BREAST LIMITED-RIGHT; Future    2. S/P mastectomy, bilateral  - US-BREAST LIMITED-RIGHT; Future    3. Breast implant status  - US-BREAST LIMITED-RIGHT; Future    4. Primary osteoarthritis of both hands  Discussed Tumeric, warm water soaks,  exercises, NSAID and tylenol as needed.

## 2019-08-22 DIAGNOSIS — M19.041 PRIMARY OSTEOARTHRITIS OF BOTH HANDS: ICD-10-CM

## 2019-08-22 DIAGNOSIS — M19.042 PRIMARY OSTEOARTHRITIS OF BOTH HANDS: ICD-10-CM

## 2019-08-22 NOTE — TELEPHONE ENCOUNTER
Was the patient seen in the last year in this department? Yes    Does patient have an active prescription for medications requested? No     Received Request Via: Patient deshawn

## 2019-08-23 NOTE — TELEPHONE ENCOUNTER
Was the patient seen in the last year in this department? Yes    Does patient have an active prescription for medications requested? No     Received Request Via: Pharmacy      Pt met protocol?: Yes    LAST OV 08/21/2019

## 2019-10-18 ENCOUNTER — HOSPITAL ENCOUNTER (OUTPATIENT)
Dept: RADIOLOGY | Facility: MEDICAL CENTER | Age: 49
End: 2019-10-18
Attending: FAMILY MEDICINE
Payer: COMMERCIAL

## 2019-10-18 DIAGNOSIS — Z98.82 BREAST IMPLANT STATUS: ICD-10-CM

## 2019-10-18 DIAGNOSIS — Z90.13 S/P MASTECTOMY, BILATERAL: ICD-10-CM

## 2019-10-18 DIAGNOSIS — N64.4 BREAST PAIN: ICD-10-CM

## 2019-10-18 PROCEDURE — 76642 ULTRASOUND BREAST LIMITED: CPT | Mod: RT

## 2019-10-29 ENCOUNTER — OFFICE VISIT (OUTPATIENT)
Dept: MEDICAL GROUP | Facility: PHYSICIAN GROUP | Age: 49
End: 2019-10-29
Payer: COMMERCIAL

## 2019-10-29 VITALS
HEART RATE: 68 BPM | TEMPERATURE: 98.5 F | DIASTOLIC BLOOD PRESSURE: 72 MMHG | OXYGEN SATURATION: 97 % | SYSTOLIC BLOOD PRESSURE: 110 MMHG | RESPIRATION RATE: 14 BRPM

## 2019-10-29 DIAGNOSIS — M65.312 TRIGGER FINGER OF LEFT THUMB: ICD-10-CM

## 2019-10-29 DIAGNOSIS — B35.1 ONYCHOMYCOSIS: ICD-10-CM

## 2019-10-29 PROCEDURE — 20550 NJX 1 TENDON SHEATH/LIGAMENT: CPT | Performed by: FAMILY MEDICINE

## 2019-10-29 PROCEDURE — 99213 OFFICE O/P EST LOW 20 MIN: CPT | Mod: 25 | Performed by: FAMILY MEDICINE

## 2019-10-29 RX ORDER — ITRACONAZOLE 100 MG/1
200 CAPSULE ORAL DAILY
Qty: 180 CAP | Refills: 0 | Status: SHIPPED | OUTPATIENT
Start: 2019-10-29 | End: 2020-01-27

## 2019-10-29 NOTE — PROGRESS NOTES
Chief Complaint   Patient presents with   • Finger Pain     lft thumb   • Nail Problem     lft great toe       HISTORY OF PRESENT ILLNESS: Patient is a 49 y.o. female established patient here today for the following concerns:    1. Onychomycosis  Here for persistent nail thickening discoloration of the left great toe nail.  Last year took course of sporanox which did resolve the right side and improved the left but seems to be returning.  Hoping to have additional round of it this year.     2. Trigger finger of left thumb  Reports decreased mobility, locking and swelling of the left MCP and PIP joint of the thumb.  Worse in the morning.  Improves with warming.  Using voltaren gel and or ibuprofen without a whole lot of improvement.   Hoping to try steroid injection.       Past Medical, Social, and Family history reviewed and updated in EPIC    Allergies:Erythromycin    Current Outpatient Medications   Medication Sig Dispense Refill   • itraconazole (SPORANOX) 100 MG Cap Take 2 Caps by mouth every day for 90 days. 180 Cap 0   • Diclofenac Sodium (VOLTAREN) 1 % Gel Apply 2 g to skin as directed 3 times a day as needed. 3 Tube 0   • Non Formulary Request   5   • liothyronine (CYTOMEL) 5 MCG Tab TAKE 1 TABLET TWICE A  Tab 1   • levothyroxine (SYNTHROID) 100 MCG Tab Take 1 Tab by mouth Every morning on an empty stomach. 90 Tab 3   • neomycin-polymyxin-HC (PEDIOTIC HC) 3.5-59616-6 Suspension Place 4 Drops in ear 3 times a day. 1 Bottle 0   • lisdexamfetamine (VYVANSE) 70 MG capsule Take 70 mg by mouth every morning.     • acetaminophen (TYLENOL) 325 MG Tab Take 2 Tabs by mouth every four hours as needed for Mild Pain, Moderate Pain or Fever. 270 Tab 3   • ibuprofen (MOTRIN) 200 MG Tab Take 600 mg by mouth every 6 hours as needed.     • VITAMIN D, ERGOCALCIFEROL, PO Take  by mouth every day.       • Multiple Vitamin (MULTIVITAMIN PO) Take  by mouth every day.       • Calcium Carbonate-Vitamin D (CALCIUM + D PO) Take   by mouth every day.         No current facility-administered medications for this visit.          ROS:  Review of Systems   Constitutional: Negative for fever, chills, weight loss and malaise/fatigue.   HENT: Negative for ear pain, nosebleeds, congestion, sore throat and neck pain.    Eyes: Negative for blurred vision.   Respiratory: Negative for cough, sputum production, shortness of breath and wheezing.    Cardiovascular: Negative for chest pain, palpitations,  and leg swelling.   Gastrointestinal: Negative for heartburn, nausea, vomiting, diarrhea and abdominal pain.   Genitourinary: Negative for dysuria, urgency and frequency.   Musculoskeletal: Negative for myalgias, back pain and joint pain.   Skin: Negative for rash and itching.   Neurological: Negative for dizziness, tingling, tremors, sensory change, focal weakness and headaches.   Endo/Heme/Allergies: Does not bruise/bleed easily.   Psychiatric/Behavioral: Negative for depression, anxiety, suicidal ideas, insomnia and memory loss.      Exam:  /72   Pulse 68   Temp 36.9 °C (98.5 °F)   Resp 14   SpO2 97%     General:  Well nourished, well developed in NAD  Head is grossly normal.  Neck: Supple without JVD   Pulmonary:  Normal effort.   Cardiovascular: Regular rate  Extremities: no clubbing, cyanosis, or edema.  Psych: affect appropriate  MSK: left thumb with crepitus, sticking on flexion, decreased ROM with impaired full flexion.        A steroid injection was performed at left thumb base using 1% plain Lidocaine and 12 mg of Kenalog. This was well tolerated.    Please note that this dictation was created using voice recognition software. I have made every reasonable attempt to correct obvious errors, but I expect that there are errors of grammar and possibly content that I did not discover before finalizing the note.    Assessment/Plan:  1. Onychomycosis  Trial of   - itraconazole (SPORANOX) 100 MG Cap; Take 2 Caps by mouth every day for 90 days.   Dispense: 180 Cap; Refill: 0    2. Trigger finger of left thumb  Trial of steroid injection, if not improving will consult with ortho-hand.

## 2019-11-04 DIAGNOSIS — E03.8 OTHER SPECIFIED HYPOTHYROIDISM: ICD-10-CM

## 2019-11-04 RX ORDER — LEVOTHYROXINE SODIUM 0.1 MG/1
100 TABLET ORAL
Qty: 90 TAB | Refills: 1 | Status: SHIPPED
Start: 2019-11-04 | End: 2020-01-28 | Stop reason: SDUPTHER

## 2019-11-04 NOTE — TELEPHONE ENCOUNTER
Was the patient seen in the last year in this department? Yes    Does patient have an active prescription for medications requested? no    Received Request Via: Pharmacy      Pt met protocol?: Yes ov 10/19 labs 2/19 tsh 1.300, 90ds to mail order

## 2020-01-28 DIAGNOSIS — E03.8 OTHER SPECIFIED HYPOTHYROIDISM: ICD-10-CM

## 2020-01-28 RX ORDER — LEVOTHYROXINE SODIUM 0.1 MG/1
100 TABLET ORAL
Qty: 90 TAB | Refills: 1 | Status: SHIPPED | OUTPATIENT
Start: 2020-01-28 | End: 2020-08-26 | Stop reason: SDUPTHER

## 2020-01-28 NOTE — TELEPHONE ENCOUNTER
Last ov 10/19  Last labs 2/19  TSH   Date Value Ref Range Status   02/02/2019 1.300 0.380 - 5.330 uIU/mL Final     Comment:     Please note new reference ranges effective 12/14/2017 10:00 AM  Pregnant Females, 1st Trimester  0.050-3.700  Pregnant Females, 2nd Trimester  0.310-4.350  Pregnant Females, 3rd Trimester  0.410-5.180           Change in pharmacy

## 2020-01-28 NOTE — TELEPHONE ENCOUNTER
Was the patient seen in the last year in this department? Yes    Does patient have an active prescription for medications requested? Yes    Received Request Via: Patient       PHARMACY CHANGE.

## 2020-03-04 DIAGNOSIS — E03.8 OTHER SPECIFIED HYPOTHYROIDISM: ICD-10-CM

## 2020-03-05 RX ORDER — LIOTHYRONINE SODIUM 5 UG/1
TABLET ORAL
Qty: 180 TAB | Refills: 0 | OUTPATIENT
Start: 2020-03-05

## 2020-04-08 ENCOUNTER — PATIENT MESSAGE (OUTPATIENT)
Dept: MEDICAL GROUP | Facility: PHYSICIAN GROUP | Age: 50
End: 2020-04-08

## 2020-04-09 RX ORDER — CEPHALEXIN 500 MG/1
500 CAPSULE ORAL 4 TIMES DAILY
Qty: 28 CAP | Refills: 0 | Status: SHIPPED | OUTPATIENT
Start: 2020-04-09 | End: 2020-04-16

## 2020-06-23 ENCOUNTER — OFFICE VISIT (OUTPATIENT)
Dept: MEDICAL GROUP | Facility: PHYSICIAN GROUP | Age: 50
End: 2020-06-23
Payer: COMMERCIAL

## 2020-06-23 ENCOUNTER — APPOINTMENT (RX ONLY)
Dept: URBAN - METROPOLITAN AREA CLINIC 35 | Facility: CLINIC | Age: 50
Setting detail: DERMATOLOGY
End: 2020-06-23

## 2020-06-23 VITALS
RESPIRATION RATE: 16 BRPM | TEMPERATURE: 98.3 F | HEART RATE: 82 BPM | DIASTOLIC BLOOD PRESSURE: 68 MMHG | SYSTOLIC BLOOD PRESSURE: 110 MMHG | OXYGEN SATURATION: 97 %

## 2020-06-23 DIAGNOSIS — L81.4 OTHER MELANIN HYPERPIGMENTATION: ICD-10-CM

## 2020-06-23 DIAGNOSIS — Z71.89 OTHER SPECIFIED COUNSELING: ICD-10-CM

## 2020-06-23 DIAGNOSIS — M65.319 TRIGGER FINGER OF THUMB, UNSPECIFIED LATERALITY: ICD-10-CM

## 2020-06-23 DIAGNOSIS — L60.9 NAIL ABNORMALITY: ICD-10-CM

## 2020-06-23 DIAGNOSIS — L57.3 POIKILODERMA OF CIVATTE: ICD-10-CM

## 2020-06-23 DIAGNOSIS — L82.1 OTHER SEBORRHEIC KERATOSIS: ICD-10-CM

## 2020-06-23 DIAGNOSIS — L01.00 IMPETIGO, UNSPECIFIED: ICD-10-CM | Status: STABLE

## 2020-06-23 DIAGNOSIS — J01.90 SUBACUTE SINUSITIS, UNSPECIFIED LOCATION: ICD-10-CM

## 2020-06-23 DIAGNOSIS — L70.0 ACNE VULGARIS: ICD-10-CM

## 2020-06-23 DIAGNOSIS — R05.9 COUGH: ICD-10-CM

## 2020-06-23 PROCEDURE — 20550 NJX 1 TENDON SHEATH/LIGAMENT: CPT | Performed by: FAMILY MEDICINE

## 2020-06-23 PROCEDURE — ? ADDITIONAL NOTES

## 2020-06-23 PROCEDURE — 99214 OFFICE O/P EST MOD 30 MIN: CPT | Mod: 25 | Performed by: FAMILY MEDICINE

## 2020-06-23 PROCEDURE — 99214 OFFICE O/P EST MOD 30 MIN: CPT

## 2020-06-23 PROCEDURE — ? PRESCRIPTION

## 2020-06-23 PROCEDURE — ? TREATMENT REGIMEN

## 2020-06-23 PROCEDURE — ? OBSERVATION AND MEASURE

## 2020-06-23 PROCEDURE — ? COUNSELING

## 2020-06-23 RX ORDER — TRETIONIN 0.25 MG/G
1 GEL TOPICAL
Qty: 1 | Refills: 11 | Status: ERX

## 2020-06-23 RX ORDER — OZENOXACIN 10 MG/G
1 CREAM TOPICAL TWICE A DAY
Qty: 1 | Refills: 3 | Status: ERX

## 2020-06-23 RX ORDER — DEXTROAMPHETAMINE SACCHARATE, AMPHETAMINE ASPARTATE MONOHYDRATE, DEXTROAMPHETAMINE SULFATE AND AMPHETAMINE SULFATE 7.5; 7.5; 7.5; 7.5 MG/1; MG/1; MG/1; MG/1
CAPSULE, EXTENDED RELEASE ORAL
COMMUNITY
Start: 2020-04-24 | End: 2020-10-29

## 2020-06-23 RX ORDER — IBUPROFEN 600 MG/1
TABLET ORAL
COMMUNITY
Start: 2020-06-22 | End: 2022-06-07

## 2020-06-23 RX ORDER — AMOXICILLIN AND CLAVULANATE POTASSIUM 875; 125 MG/1; MG/1
1 TABLET, FILM COATED ORAL 2 TIMES DAILY
Qty: 20 TAB | Refills: 0 | Status: SHIPPED | OUTPATIENT
Start: 2020-06-23 | End: 2020-07-03

## 2020-06-23 RX ORDER — HYDROCODONE BITARTRATE AND ACETAMINOPHEN 5; 325 MG/1; MG/1
TABLET ORAL
COMMUNITY
Start: 2020-06-22 | End: 2020-10-29

## 2020-06-23 RX ADMIN — TRIAMCINOLONE ACETONIDE 12 MG: 40 INJECTION, SUSPENSION INTRA-ARTICULAR; INTRAMUSCULAR at 14:45

## 2020-06-23 RX ADMIN — Medication 1 ML: at 14:45

## 2020-06-23 ASSESSMENT — LOCATION ZONE DERM
LOCATION ZONE: NECK
LOCATION ZONE: TRUNK
LOCATION ZONE: NOSE
LOCATION ZONE: ARM

## 2020-06-23 ASSESSMENT — LOCATION DETAILED DESCRIPTION DERM
LOCATION DETAILED: LEFT ANTERIOR MEDIAL PROXIMAL UPPER ARM
LOCATION DETAILED: EPIGASTRIC SKIN
LOCATION DETAILED: LEFT MEDIAL UPPER BACK
LOCATION DETAILED: LEFT NARIS
LOCATION DETAILED: RIGHT LATERAL SUPERIOR CHEST
LOCATION DETAILED: RIGHT CENTRAL LATERAL NECK

## 2020-06-23 ASSESSMENT — LOCATION SIMPLE DESCRIPTION DERM
LOCATION SIMPLE: LEFT UPPER BACK
LOCATION SIMPLE: ABDOMEN
LOCATION SIMPLE: LEFT NOSE
LOCATION SIMPLE: NECK
LOCATION SIMPLE: LEFT UPPER ARM
LOCATION SIMPLE: CHEST

## 2020-06-23 ASSESSMENT — PATIENT HEALTH QUESTIONNAIRE - PHQ9: CLINICAL INTERPRETATION OF PHQ2 SCORE: 0

## 2020-06-23 NOTE — PROCEDURE: COUNSELING
Detail Level: Detailed
Detail Level: Generalized
Birth Control Pills Counseling: Birth Control Pill Counseling: I discussed with the patient the potential side effects of OCPs including but not limited to increased risk of stroke, heart attack, thrombophlebitis, deep venous thrombosis, hepatic adenomas, breast changes, GI upset, headaches, and depression.  The patient verbalized understanding of the proper use and possible adverse effects of OCPs. All of the patient's questions and concerns were addressed.
Doxycycline Counseling:  Patient counseled regarding possible photosensitivity and increased risk for sunburn.  Patient instructed to avoid sunlight, if possible.  When exposed to sunlight, patients should wear protective clothing, sunglasses, and sunscreen.  The patient was instructed to call the office immediately if the following severe adverse effects occur:  hearing changes, easy bruising/bleeding, severe headache, or vision changes.  The patient verbalized understanding of the proper use and possible adverse effects of doxycycline.  All of the patient's questions and concerns were addressed.
Isotretinoin Counseling: Patient should get monthly blood tests, not donate blood, not drive at night if vision affected, not share medication, and not undergo elective surgery for 6 months after tx completed. Side effects reviewed, pt to contact office should one occur.
Detail Level: Zone
Minocycline Counseling: Patient advised regarding possible photosensitivity and discoloration of the teeth, skin, lips, tongue and gums.  Patient instructed to avoid sunlight, if possible.  When exposed to sunlight, patients should wear protective clothing, sunglasses, and sunscreen.  The patient was instructed to call the office immediately if the following severe adverse effects occur:  hearing changes, easy bruising/bleeding, severe headache, or vision changes.  The patient verbalized understanding of the proper use and possible adverse effects of minocycline.  All of the patient's questions and concerns were addressed.
Spironolactone Counseling: Patient advised regarding risks of diarrhea, abdominal pain, hyperkalemia, birth defects (for female patients), liver toxicity and renal toxicity. The patient may need blood work to monitor liver and kidney function and potassium levels while on therapy. The patient verbalized understanding of the proper use and possible adverse effects of spironolactone.  All of the patient's questions and concerns were addressed.
Include Pregnancy/Lactation Warning?: No
Sarecycline Pregnancy And Lactation Text: This medication is Pregnancy Category D and not consider safe during pregnancy. It is also excreted in breast milk.
Topical Retinoid Pregnancy And Lactation Text: This medication is Pregnancy Category C. It is unknown if this medication is excreted in breast milk.
Topical Clindamycin Pregnancy And Lactation Text: This medication is Pregnancy Category B and is considered safe during pregnancy. It is unknown if it is excreted in breast milk.
Azithromycin Counseling:  I discussed with the patient the risks of azithromycin including but not limited to GI upset, allergic reaction, drug rash, diarrhea, and yeast infections.
Birth Control Pills Pregnancy And Lactation Text: This medication should be avoided if pregnant and for the first 30 days post-partum.
Doxycycline Pregnancy And Lactation Text: This medication is Pregnancy Category D and not consider safe during pregnancy. It is also excreted in breast milk but is considered safe for shorter treatment courses.
Isotretinoin Pregnancy And Lactation Text: This medication is Pregnancy Category X and is considered extremely dangerous during pregnancy. It is unknown if it is excreted in breast milk.
Benzoyl Peroxide Counseling: Patient counseled that medicine may cause skin irritation and bleach clothing.  In the event of skin irritation, the patient was advised to reduce the amount of the drug applied or use it less frequently.   The patient verbalized understanding of the proper use and possible adverse effects of benzoyl peroxide.  All of the patient's questions and concerns were addressed.
Tazorac Counseling:  Patient advised that medication is irritating and drying.  Patient may need to apply sparingly and wash off after an hour before eventually leaving it on overnight.  The patient verbalized understanding of the proper use and possible adverse effects of tazorac.  All of the patient's questions and concerns were addressed.
Topical Sulfur Applications Counseling: Topical Sulfur Counseling: Patient counseled that this medication may cause skin irritation or allergic reactions.  In the event of skin irritation, the patient was advised to reduce the amount of the drug applied or use it less frequently.   The patient verbalized understanding of the proper use and possible adverse effects of topical sulfur application.  All of the patient's questions and concerns were addressed.
Azithromycin Pregnancy And Lactation Text: This medication is considered safe during pregnancy and is also secreted in breast milk.
Dapsone Counseling: I discussed with the patient the risks of dapsone including but not limited to hemolytic anemia, agranulocytosis, rashes, methemoglobinemia, kidney failure, peripheral neuropathy, headaches, GI upset, and liver toxicity.  Patients who start dapsone require monitoring including baseline LFTs and weekly CBCs for the first month, then every month thereafter.  The patient verbalized understanding of the proper use and possible adverse effects of dapsone.  All of the patient's questions and concerns were addressed.
Erythromycin Counseling:  I discussed with the patient the risks of erythromycin including but not limited to GI upset, allergic reaction, drug rash, diarrhea, increase in liver enzymes, and yeast infections.
High Dose Vitamin A Counseling: Side effects reviewed, pt to contact office should one occur.
Sarecycline Counseling: Patient advised regarding possible photosensitivity and discoloration of the teeth, skin, lips, tongue and gums.  Patient instructed to avoid sunlight, if possible.  When exposed to sunlight, patients should wear protective clothing, sunglasses, and sunscreen.  The patient was instructed to call the office immediately if the following severe adverse effects occur:  hearing changes, easy bruising/bleeding, severe headache, or vision changes.  The patient verbalized understanding of the proper use and possible adverse effects of sarecycline.  All of the patient's questions and concerns were addressed.
Tetracycline Counseling: Patient counseled regarding possible photosensitivity and increased risk for sunburn.  Patient instructed to avoid sunlight, if possible.  When exposed to sunlight, patients should wear protective clothing, sunglasses, and sunscreen.  The patient was instructed to call the office immediately if the following severe adverse effects occur:  hearing changes, easy bruising/bleeding, severe headache, or vision changes.  The patient verbalized understanding of the proper use and possible adverse effects of tetracycline.  All of the patient's questions and concerns were addressed. Patient understands to avoid pregnancy while on therapy due to potential birth defects.
Spironolactone Pregnancy And Lactation Text: This medication can cause feminization of the male fetus and should be avoided during pregnancy. The active metabolite is also found in breast milk.
Benzoyl Peroxide Pregnancy And Lactation Text: This medication is Pregnancy Category C. It is unknown if benzoyl peroxide is excreted in breast milk.
Tazorac Pregnancy And Lactation Text: This medication is not safe during pregnancy. It is unknown if this medication is excreted in breast milk.
Topical Sulfur Applications Pregnancy And Lactation Text: This medication is Pregnancy Category C and has an unknown safety profile during pregnancy. It is unknown if this topical medication is excreted in breast milk.
Bactrim Counseling:  I discussed with the patient the risks of sulfa antibiotics including but not limited to GI upset, allergic reaction, drug rash, diarrhea, dizziness, photosensitivity, and yeast infections.  Rarely, more serious reactions can occur including but not limited to aplastic anemia, agranulocytosis, methemoglobinemia, blood dyscrasias, liver or kidney failure, lung infiltrates or desquamative/blistering drug rashes.
Bactrim Pregnancy And Lactation Text: This medication is Pregnancy Category D and is known to cause fetal risk.  It is also excreted in breast milk.
Dapsone Pregnancy And Lactation Text: This medication is Pregnancy Category C and is not considered safe during pregnancy or breast feeding.
Erythromycin Pregnancy And Lactation Text: This medication is Pregnancy Category B and is considered safe during pregnancy. It is also excreted in breast milk.
High Dose Vitamin A Pregnancy And Lactation Text: High dose vitamin A therapy is contraindicated during pregnancy and breast feeding.
Topical Retinoid counseling:  Patient advised to apply a pea-sized amount only at bedtime and wait 30 minutes after washing their face before applying.  If too drying, patient may add a non-comedogenic moisturizer. The patient verbalized understanding of the proper use and possible adverse effects of retinoids.  All of the patient's questions and concerns were addressed.
Topical Clindamycin Counseling: Patient counseled that this medication may cause skin irritation or allergic reactions.  In the event of skin irritation, the patient was advised to reduce the amount of the drug applied or use it less frequently.   The patient verbalized understanding of the proper use and possible adverse effects of clindamycin.  All of the patient's questions and concerns were addressed.

## 2020-06-23 NOTE — PROGRESS NOTES
Chief Complaint   Patient presents with   • Finger Pain     lft thumb       HISTORY OF PRESENT ILLNESS: Patient is a 50 y.o. female established patient here today for the following concerns:    1. Cough  4. Subacute sinusitis, unspecified location  This is a pleasant 50 year old female with about 9 month hx of coughing that started in the fall.  Initially thought to be allergy mediated, but only recently started to improve.  Described as dry tickling cough without associated illness, fever, chills, wheezing or productivity.  She does not frequent throat clearing, which is worse in the morning.  Her  noted cough throughout the night.  On ARB.  No globus sensation. She does note some occasional heatburn not on PPI therapy. No hx of asthma.       2. 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  Last year had steroid injection which helped for the whole year.  would karo to have another injection if possible.  No stiffness and mild swelling especially in the morning.  Several DIP joints as well on both hands.      3. Nail abnormality  Lastly, has undergone treatment with lamisil and then sporonox for the nail fungus.  It improved everywhere except for one corner of her great nail.            Past Medical, Social, and Family history reviewed and updated in EPIC    Allergies:Erythromycin    Current Outpatient Medications   Medication Sig Dispense Refill   • amphetamine-dextroamphetamine ER (ADDERALL XR) 30 MG XR capsule      • amoxicillin-clavulanate (AUGMENTIN) 875-125 MG Tab Take 1 Tab by mouth 2 times a day for 10 days. 20 Tab 0   • liothyronine (CYTOMEL) 5 MCG Tab TAKE 1 TABLET TWICE A  Tab 0   • levothyroxine (SYNTHROID) 100 MCG Tab Take 1 Tab by mouth Every morning on an empty stomach. 90 Tab 1   • Diclofenac Sodium (VOLTAREN) 1 % Gel Apply 2 g to skin as directed 3 times a day as needed. 3  Tube 0   • Non Formulary Request   5   • acetaminophen (TYLENOL) 325 MG Tab Take 2 Tabs by mouth every four hours as needed for Mild Pain, Moderate Pain or Fever. 270 Tab 3   • ibuprofen (MOTRIN) 200 MG Tab Take 600 mg by mouth every 6 hours as needed.     • VITAMIN D, ERGOCALCIFEROL, PO Take  by mouth every day.       • Multiple Vitamin (MULTIVITAMIN PO) Take  by mouth every day.       • Calcium Carbonate-Vitamin D (CALCIUM + D PO) Take  by mouth every day.       • HYDROcodone-acetaminophen (NORCO) 5-325 MG Tab per tablet      • ibuprofen (MOTRIN) 600 MG Tab      • neomycin-polymyxin-HC (PEDIOTIC HC) 3.5-02426-7 Suspension Place 4 Drops in ear 3 times a day. (Patient not taking: Reported on 6/23/2020) 1 Bottle 0   • lisdexamfetamine (VYVANSE) 70 MG capsule Take 70 mg by mouth every morning.       No current facility-administered medications for this visit.          ROS:  Review of Systems   Constitutional: Negative for fever, chills, weight loss and malaise/fatigue.   HENT: Negative for ear pain, nosebleeds, congestion, sore throat and neck pain.    Eyes: Negative for blurred vision.   Respiratory: Negative for cough, sputum production, shortness of breath and wheezing.    Cardiovascular: Negative for chest pain, palpitations,  and leg swelling.   Gastrointestinal: Negative for heartburn, nausea, vomiting, diarrhea and abdominal pain.   Genitourinary: Negative for dysuria, urgency and frequency.   Musculoskeletal: Negative for myalgias, back pain and joint pain.   Skin: Negative for rash and itching.   Neurological: Negative for dizziness, tingling, tremors, sensory change, focal weakness and headaches.   Endo/Heme/Allergies: Does not bruise/bleed easily.   Psychiatric/Behavioral: Negative for depression, anxiety, suicidal ideas, insomnia and memory loss.      Exam:  /68   Pulse 82   Temp 36.8 °C (98.3 °F)   Resp 16   SpO2 97%     General:  Well nourished, well developed in NAD  Head is grossly  normal.  HEENT: TM clear bilaterally.  Significant PND. Nasal mucosa edematous with purulent nasal discharge.  Sinus tenderness on palpation.   Neck: Supple without JVD   Pulmonary:  Normal effort. CTAB no w/r/c  Cardiovascular: Regular rate and rhythm   Extremities: no clubbing, cyanosis, or edema.  Psych: affect appropriate  MSK: left thumb with catching upon extension of the MCP and PIP joints.       A steroid injection was performed at left Flexor tendon thumb using 1% plain Lidocaine and 12.5 mg of Kenalog. This was well tolerated.    Please note that this dictation was created using voice recognition software. I have made every reasonable attempt to correct obvious errors, but I expect that there are errors of grammar and possibly content that I did not discover before finalizing the note.    Assessment/Plan:  1. Cough  Suspect due to sinusitis.   Treat accordingly.  If not resolving will consider imaging.  ?ARB  - amoxicillin-clavulanate (AUGMENTIN) 875-125 MG Tab; Take 1 Tab by mouth 2 times a day for 10 days.  Dispense: 20 Tab; Refill: 0    2. Trigger finger of thumb, unspecified laterality  Injected today.  Monitor for s/sx of infection or worsening.  Follow up as needed.     3. Nail abnormality  Will obtain fungal culture and treat accordingly.   - Fungal Culture-Skin/Hair; Future    4. Subacute sinusitis, unspecified location  Start   - amoxicillin-clavulanate (AUGMENTIN) 875-125 MG Tab; Take 1 Tab by mouth 2 times a day for 10 days.  Dispense: 20 Tab; Refill: 0    Follow up as needed.

## 2020-06-23 NOTE — PROCEDURE: TREATMENT REGIMEN
Initiate Treatment: Xepi cream apply in nose twice daily as needed
Detail Level: Zone
Initiate Treatment: Tretinoin 0.025% gel apply nightly to face

## 2020-06-23 NOTE — PROCEDURE: ADDITIONAL NOTES
Detail Level: Zone
Additional Notes: Prescription provided for Xepi 1% cream apply in nose twice daily. Hx of + staph infection, not resolved with mupirocin
Additional Notes: Consider treatment with Hydroquinone cream

## 2020-06-30 RX ORDER — TRIAMCINOLONE ACETONIDE 40 MG/ML
12.5 INJECTION, SUSPENSION INTRA-ARTICULAR; INTRAMUSCULAR ONCE
Status: COMPLETED | OUTPATIENT
Start: 2020-06-30 | End: 2020-06-23

## 2020-08-26 DIAGNOSIS — E03.8 OTHER SPECIFIED HYPOTHYROIDISM: ICD-10-CM

## 2020-08-27 RX ORDER — LEVOTHYROXINE SODIUM 0.1 MG/1
100 TABLET ORAL
Qty: 90 TAB | Refills: 1 | Status: SHIPPED | OUTPATIENT
Start: 2020-08-27 | End: 2021-03-09 | Stop reason: SDUPTHER

## 2020-08-27 RX ORDER — LIOTHYRONINE SODIUM 5 UG/1
TABLET ORAL
Qty: 180 TAB | Refills: 0 | Status: SHIPPED | OUTPATIENT
Start: 2020-08-27 | End: 2020-12-02 | Stop reason: SDUPTHER

## 2020-09-29 ENCOUNTER — APPOINTMENT (RX ONLY)
Dept: URBAN - METROPOLITAN AREA CLINIC 35 | Facility: CLINIC | Age: 50
Setting detail: DERMATOLOGY
End: 2020-09-29

## 2020-09-29 DIAGNOSIS — L91.8 OTHER HYPERTROPHIC DISORDERS OF THE SKIN: ICD-10-CM

## 2020-09-29 DIAGNOSIS — L82.1 OTHER SEBORRHEIC KERATOSIS: ICD-10-CM

## 2020-09-29 DIAGNOSIS — L73.8 OTHER SPECIFIED FOLLICULAR DISORDERS: ICD-10-CM

## 2020-09-29 PROCEDURE — ? LIQUID NITROGEN (COSMETIC)

## 2020-09-29 PROCEDURE — ? BENIGN DESTRUCTION COSMETIC

## 2020-09-29 PROCEDURE — ? COUNSELING

## 2020-09-29 PROCEDURE — ? ADDITIONAL NOTES

## 2020-09-29 ASSESSMENT — LOCATION DETAILED DESCRIPTION DERM
LOCATION DETAILED: RIGHT ANTERIOR PROXIMAL THIGH
LOCATION DETAILED: RIGHT LATERAL PROXIMAL PRETIBIAL REGION
LOCATION DETAILED: RIGHT SUPERIOR LATERAL MIDBACK
LOCATION DETAILED: LEFT MEDIAL BREAST 9-10:00 REGION
LOCATION DETAILED: LEFT LATERAL SUPERIOR CHEST
LOCATION DETAILED: LEFT INFERIOR LATERAL UPPER BACK
LOCATION DETAILED: RIGHT ANTERIOR DISTAL THIGH
LOCATION DETAILED: LEFT ANTERIOR LATERAL DISTAL THIGH
LOCATION DETAILED: LEFT INFERIOR LATERAL NECK
LOCATION DETAILED: RIGHT ANTERIOR MEDIAL DISTAL THIGH
LOCATION DETAILED: RIGHT MEDIAL SUPERIOR CHEST
LOCATION DETAILED: RIGHT MEDIAL PROXIMAL PRETIBIAL REGION
LOCATION DETAILED: LEFT MEDIAL BREAST 8-9:00 REGION
LOCATION DETAILED: RIGHT POSTERIOR SHOULDER
LOCATION DETAILED: RIGHT MEDIAL MALAR CHEEK
LOCATION DETAILED: LEFT MEDIAL SUPERIOR CHEST
LOCATION DETAILED: LEFT INFERIOR UPPER BACK
LOCATION DETAILED: LEFT CLAVICULAR NECK
LOCATION DETAILED: RIGHT PROXIMAL POSTERIOR UPPER ARM
LOCATION DETAILED: RIGHT MEDIAL BREAST 4-5:00 REGION
LOCATION DETAILED: LEFT INFRAMAMMARY CREASE (OUTER QUADRANT)
LOCATION DETAILED: LEFT ANTERIOR DISTAL THIGH
LOCATION DETAILED: LEFT INFERIOR FOREHEAD
LOCATION DETAILED: LEFT LATERAL EYEBROW
LOCATION DETAILED: LEFT INFERIOR MEDIAL FOREHEAD
LOCATION DETAILED: LEFT ANTERIOR PROXIMAL THIGH
LOCATION DETAILED: LEFT SUPERIOR LATERAL UPPER BACK
LOCATION DETAILED: LEFT ANTERIOR LATERAL PROXIMAL THIGH
LOCATION DETAILED: RIGHT RIB CAGE
LOCATION DETAILED: RIGHT INFERIOR LATERAL FOREHEAD
LOCATION DETAILED: RIGHT PROXIMAL PRETIBIAL REGION

## 2020-09-29 ASSESSMENT — LOCATION SIMPLE DESCRIPTION DERM
LOCATION SIMPLE: RIGHT LOWER BACK
LOCATION SIMPLE: RIGHT THIGH
LOCATION SIMPLE: RIGHT CHEEK
LOCATION SIMPLE: RIGHT SHOULDER
LOCATION SIMPLE: LEFT FOREHEAD
LOCATION SIMPLE: RIGHT BREAST
LOCATION SIMPLE: LEFT THIGH
LOCATION SIMPLE: LEFT UPPER BACK
LOCATION SIMPLE: LEFT BREAST
LOCATION SIMPLE: CHEST
LOCATION SIMPLE: RIGHT POSTERIOR UPPER ARM
LOCATION SIMPLE: LEFT ANTERIOR NECK
LOCATION SIMPLE: LEFT EYEBROW
LOCATION SIMPLE: RIGHT PRETIBIAL REGION
LOCATION SIMPLE: ABDOMEN
LOCATION SIMPLE: RIGHT FOREHEAD

## 2020-09-29 ASSESSMENT — LOCATION ZONE DERM
LOCATION ZONE: TRUNK
LOCATION ZONE: LEG
LOCATION ZONE: NECK
LOCATION ZONE: ARM
LOCATION ZONE: FACE

## 2020-09-29 NOTE — PROCEDURE: LIQUID NITROGEN (COSMETIC)
Consent: The patient's consent was obtained including but not limited to risks of crusting, scabbing, blistering, scarring, darker or lighter pigmentary change, recurrence, incomplete removal and infection. The patient understands that the procedure is cosmetic in nature and is not covered by insurance.
Billing Information: Bill by Static Price
Price (Use Numbers Only, No Special Characters Or $): 125.00
Detail Level: Detailed
Post-Care Instructions: I reviewed with the patient in detail post-care instructions. Patient is to wear sunprotection, and avoid picking at any of the treated lesions. Pt may apply Vaseline to crusted or scabbing areas.
Render Post-Care Instructions In Note?: no

## 2020-09-29 NOTE — PROCEDURE: ADDITIONAL NOTES
Additional Notes: Sebaceous Hyperplasia lesions treated with liquid nitrogen via probe
Detail Level: Detailed

## 2020-09-29 NOTE — PROCEDURE: BENIGN DESTRUCTION COSMETIC
Detail Level: Detailed
Consent: The patient's consent was obtained including but not limited to risks of crusting, scabbing, blistering, scarring, darker or lighter pigmentary change, recurrence, incomplete removal and infection.
Anesthesia Volume In Cc: 0.5
Price (Use Numbers Only, No Special Characters Or $): 0.00
Post-Care Instructions: I reviewed with the patient in detail post-care instructions. Patient is to wear sunprotection, and avoid picking at any of the treated lesions. Pt may apply Vaseline to crusted or scabbing areas.

## 2020-09-30 ENCOUNTER — OFFICE VISIT (OUTPATIENT)
Dept: MEDICAL GROUP | Facility: PHYSICIAN GROUP | Age: 50
End: 2020-09-30
Payer: COMMERCIAL

## 2020-09-30 VITALS
OXYGEN SATURATION: 97 % | TEMPERATURE: 98.4 F | RESPIRATION RATE: 16 BRPM | HEART RATE: 70 BPM | SYSTOLIC BLOOD PRESSURE: 134 MMHG | DIASTOLIC BLOOD PRESSURE: 86 MMHG

## 2020-09-30 DIAGNOSIS — R23.2 HOT FLASHES: ICD-10-CM

## 2020-09-30 DIAGNOSIS — E03.8 OTHER SPECIFIED HYPOTHYROIDISM: ICD-10-CM

## 2020-09-30 DIAGNOSIS — E78.2 MIXED HYPERLIPIDEMIA: ICD-10-CM

## 2020-09-30 DIAGNOSIS — Z85.3 PERSONAL HISTORY OF BREAST CANCER: ICD-10-CM

## 2020-09-30 DIAGNOSIS — E55.9 VITAMIN D DEFICIENCY: ICD-10-CM

## 2020-09-30 DIAGNOSIS — R05.3 CHRONIC COUGH: ICD-10-CM

## 2020-09-30 PROCEDURE — 99214 OFFICE O/P EST MOD 30 MIN: CPT | Performed by: FAMILY MEDICINE

## 2020-09-30 RX ORDER — FLUTICASONE PROPIONATE 50 MCG
2 SPRAY, SUSPENSION (ML) NASAL DAILY
Qty: 16 G | Refills: 11 | Status: SHIPPED | OUTPATIENT
Start: 2020-09-30 | End: 2022-06-07

## 2020-09-30 NOTE — PROGRESS NOTES
Chief Complaint   Patient presents with   • Cough     chronic cough;fv    • Requesting Labs       HISTORY OF PRESENT ILLNESS: Patient is a 50 y.o. female established patient here today for the following concerns:    1. Chronic cough  Here today for follow up on chronic cough.  She reports that she has continued with several months of cough, worse at night while lying down.  She did a trial of abx for possible sinusitis and did feel like it had improved temporarily.  She is up coughing most the night.  No wheezing.  No GERD symptoms.  She denies any foul taste.  No fevers, chills.  Does have a FHx of adult onset asthma in grandfather.  No hempotysis.  But given hx of breast cancer she is a bit worried about recurrent disease and requests imaging.     2. Personal history of breast cancer  Requests .  Last done about 2 years ago and normal.      3. Mixed hyperlipidemia  4. Other specified hypothyroidism  5. Vitamin D deficiency  Due for updated labs on her thyroid, cholesterol.  Admits that she has been struggling with the weight and diet lately.  No hair or skin changes.     6. Hot flashes  Lastly, she has had some hot flashes more recently.  She is s/p hysterectomy so unsure if she is menopausal.  She is unable to take hormones, but feels if she knew it would be better to understand what is happening.        Past Medical, Social, and Family history reviewed and updated in EPIC    Allergies:Erythromycin    Current Outpatient Medications   Medication Sig Dispense Refill   • fluticasone (FLONASE) 50 MCG/ACT nasal spray Spray 2 Sprays in nose every day. Each Nostril 16 g 11   • levothyroxine (SYNTHROID) 100 MCG Tab Take 1 Tab by mouth Every morning on an empty stomach. 90 Tab 1   • liothyronine (CYTOMEL) 5 MCG Tab TAKE 1 TABLET TWICE A  Tab 0   • ibuprofen (MOTRIN) 600 MG Tab      • Diclofenac Sodium (VOLTAREN) 1 % Gel Apply 2 g to skin as directed 3 times a day as needed. 3 Tube 0   • Non Formulary Request    5   • acetaminophen (TYLENOL) 325 MG Tab Take 2 Tabs by mouth every four hours as needed for Mild Pain, Moderate Pain or Fever. 270 Tab 3   • ibuprofen (MOTRIN) 200 MG Tab Take 600 mg by mouth every 6 hours as needed.     • VITAMIN D, ERGOCALCIFEROL, PO Take  by mouth every day.       • Multiple Vitamin (MULTIVITAMIN PO) Take  by mouth every day.       • Calcium Carbonate-Vitamin D (CALCIUM + D PO) Take  by mouth every day.       • amphetamine-dextroamphetamine ER (ADDERALL XR) 30 MG XR capsule      • HYDROcodone-acetaminophen (NORCO) 5-325 MG Tab per tablet      • neomycin-polymyxin-HC (PEDIOTIC HC) 3.5-33760-4 Suspension Place 4 Drops in ear 3 times a day. (Patient not taking: Reported on 6/23/2020) 1 Bottle 0   • lisdexamfetamine (VYVANSE) 70 MG capsule Take 70 mg by mouth every morning.       No current facility-administered medications for this visit.          ROS:  Review of Systems   Constitutional: Negative for fever, chills, weight loss and malaise/fatigue.   HENT: Negative for ear pain, nosebleeds, congestion, sore throat and neck pain.    Eyes: Negative for blurred vision.   Respiratory: Negative for cough, sputum production, shortness of breath and wheezing.    Cardiovascular: Negative for chest pain, palpitations,  and leg swelling.   Gastrointestinal: Negative for heartburn, nausea, vomiting, diarrhea and abdominal pain.   Genitourinary: Negative for dysuria, urgency and frequency.   Musculoskeletal: Negative for myalgias, back pain and joint pain.   Skin: Negative for rash and itching.   Neurological: Negative for dizziness, tingling, tremors, sensory change, focal weakness and headaches.   Endo/Heme/Allergies: Does not bruise/bleed easily.   Psychiatric/Behavioral: Negative for depression, anxiety, suicidal ideas, insomnia and memory loss.      Exam:  /86   Pulse 70   Temp 36.9 °C (98.4 °F)   Resp 16   SpO2 97%     General:  Well nourished, well developed in NAD  Head is grossly  normal.  HEENT: Normocephalic and atraumatic. TMs clear bilaterally. Oropharynx is clear without erythema and no tonsillar exudate. Nasal mucosa pink with minimal rhinorrhea.  Neck: Supple without JVD   Pulmonary:  Normal effort. CTAB no w/r/c  Cardiovascular: Regular rate and rhythm   Extremities: no clubbing, cyanosis, or edema.  Psych: affect appropriate      Please note that this dictation was created using voice recognition software. I have made every reasonable attempt to correct obvious errors, but I expect that there are errors of grammar and possibly content that I did not discover before finalizing the note.    Assessment/Plan:  1. Chronic cough  Suspect rhinosinusitis still, but will check CXR.  Consider PFTs for possible allergic asthma.  Start flonase.  Check labs.   - DX-CHEST-2 VIEWS; Future  - CBC WITH DIFFERENTIAL; Future  - fluticasone (FLONASE) 50 MCG/ACT nasal spray; Spray 2 Sprays in nose every day. Each Nostril  Dispense: 16 g; Refill: 11  - PULMONARY FUNCTION TESTS -Test requested: Complete Pulmonary Function Test; Future    2. Personal history of breast cancer  - CANCER ANTIGEN 125; Future  - CBC WITH DIFFERENTIAL; Future    3. Mixed hyperlipidemia  - Lipid Profile; Future  - Comp Metabolic Panel; Future    4. Other specified hypothyroidism  Continue current dosing, check labs.   - TSH; Future  - FREE THYROXINE; Future  - TRIIDOTHYRONINE; Future    5. Vitamin D deficiency  - VITAMIN D,25 HYDROXY; Future    6. Hot flashes  - FSH/LH; Future  - ESTRADIOL; Future    Follow up after testing.

## 2020-10-05 ENCOUNTER — HOSPITAL ENCOUNTER (OUTPATIENT)
Dept: RADIOLOGY | Facility: MEDICAL CENTER | Age: 50
End: 2020-10-05
Attending: FAMILY MEDICINE
Payer: COMMERCIAL

## 2020-10-05 DIAGNOSIS — R05.3 CHRONIC COUGH: ICD-10-CM

## 2020-10-05 PROCEDURE — 71046 X-RAY EXAM CHEST 2 VIEWS: CPT

## 2020-10-06 LAB
25(OH)D3+25(OH)D2 SERPL-MCNC: 43.4 NG/ML (ref 30–100)
ALBUMIN SERPL-MCNC: 4.4 G/DL (ref 3.8–4.8)
ALBUMIN/GLOB SERPL: 1.7 {RATIO} (ref 1.2–2.2)
ALP SERPL-CCNC: 80 IU/L (ref 39–117)
ALT SERPL-CCNC: 19 IU/L (ref 0–32)
AST SERPL-CCNC: 20 IU/L (ref 0–40)
BASOPHILS # BLD AUTO: 0.1 X10E3/UL (ref 0–0.2)
BASOPHILS NFR BLD AUTO: 1 %
BILIRUB SERPL-MCNC: 0.4 MG/DL (ref 0–1.2)
BUN SERPL-MCNC: 11 MG/DL (ref 6–24)
BUN/CREAT SERPL: 14 (ref 9–23)
CALCIUM SERPL-MCNC: 9.6 MG/DL (ref 8.7–10.2)
CANCER AG125 SERPL-ACNC: 21.9 U/ML (ref 0–38.1)
CHLORIDE SERPL-SCNC: 103 MMOL/L (ref 96–106)
CHOLEST SERPL-MCNC: 246 MG/DL (ref 100–199)
CO2 SERPL-SCNC: 21 MMOL/L (ref 20–29)
CREAT SERPL-MCNC: 0.81 MG/DL (ref 0.57–1)
EOSINOPHIL # BLD AUTO: 0.3 X10E3/UL (ref 0–0.4)
EOSINOPHIL NFR BLD AUTO: 3 %
ERYTHROCYTE [DISTWIDTH] IN BLOOD BY AUTOMATED COUNT: 13.3 % (ref 11.7–15.4)
ESTRADIOL SERPL-MCNC: 135 PG/ML
FSH SERPL-ACNC: 2.8 MIU/ML
GLOBULIN SER CALC-MCNC: 2.6 G/DL (ref 1.5–4.5)
GLUCOSE SERPL-MCNC: 88 MG/DL (ref 65–99)
HCT VFR BLD AUTO: 43.3 % (ref 34–46.6)
HDLC SERPL-MCNC: 50 MG/DL
HGB BLD-MCNC: 14.5 G/DL (ref 11.1–15.9)
IMM GRANULOCYTES # BLD AUTO: 0 X10E3/UL (ref 0–0.1)
IMM GRANULOCYTES NFR BLD AUTO: 0 %
IMMATURE CELLS  115398: NORMAL
LABORATORY COMMENT REPORT: ABNORMAL
LDLC SERPL CALC-MCNC: 152 MG/DL (ref 0–99)
LH SERPL-ACNC: 3.4 MIU/ML
LYMPHOCYTES # BLD AUTO: 2.7 X10E3/UL (ref 0.7–3.1)
LYMPHOCYTES NFR BLD AUTO: 28 %
MCH RBC QN AUTO: 30 PG (ref 26.6–33)
MCHC RBC AUTO-ENTMCNC: 33.5 G/DL (ref 31.5–35.7)
MCV RBC AUTO: 90 FL (ref 79–97)
MONOCYTES # BLD AUTO: 0.6 X10E3/UL (ref 0.1–0.9)
MONOCYTES NFR BLD AUTO: 6 %
MORPHOLOGY BLD-IMP: NORMAL
NEUTROPHILS # BLD AUTO: 6 X10E3/UL (ref 1.4–7)
NEUTROPHILS NFR BLD AUTO: 62 %
NRBC BLD AUTO-RTO: NORMAL %
PLATELET # BLD AUTO: 345 X10E3/UL (ref 150–450)
POTASSIUM SERPL-SCNC: 4.6 MMOL/L (ref 3.5–5.2)
PROT SERPL-MCNC: 7 G/DL (ref 6–8.5)
RBC # BLD AUTO: 4.83 X10E6/UL (ref 3.77–5.28)
SODIUM SERPL-SCNC: 138 MMOL/L (ref 134–144)
T3 SERPL-MCNC: 116 NG/DL (ref 71–180)
T4 FREE SERPL-MCNC: 1.37 NG/DL (ref 0.82–1.77)
TRIGL SERPL-MCNC: 238 MG/DL (ref 0–149)
TSH SERPL DL<=0.005 MIU/L-ACNC: 0.28 UIU/ML (ref 0.45–4.5)
VLDLC SERPL CALC-MCNC: 44 MG/DL (ref 5–40)
WBC # BLD AUTO: 9.7 X10E3/UL (ref 3.4–10.8)

## 2020-10-08 ENCOUNTER — TELEPHONE (OUTPATIENT)
Dept: MEDICAL GROUP | Facility: PHYSICIAN GROUP | Age: 50
End: 2020-10-08

## 2020-10-08 NOTE — TELEPHONE ENCOUNTER
I spoke to Rosaura.    Rosaura advised of MD couch in full.  
Patient will need follow-up appointment with Dr. Joseph to discuss labs.  
OB/GYN

## 2020-10-29 ENCOUNTER — TELEMEDICINE (OUTPATIENT)
Dept: MEDICAL GROUP | Facility: PHYSICIAN GROUP | Age: 50
End: 2020-10-29
Payer: COMMERCIAL

## 2020-10-29 VITALS — HEIGHT: 66 IN | BODY MASS INDEX: 27.28 KG/M2 | OXYGEN SATURATION: 99 %

## 2020-10-29 DIAGNOSIS — Z85.3 HISTORY OF BREAST CANCER: ICD-10-CM

## 2020-10-29 DIAGNOSIS — E03.8 OTHER SPECIFIED HYPOTHYROIDISM: ICD-10-CM

## 2020-10-29 DIAGNOSIS — E78.2 MIXED HYPERLIPIDEMIA: ICD-10-CM

## 2020-10-29 DIAGNOSIS — N64.4 BREAST PAIN: ICD-10-CM

## 2020-10-29 DIAGNOSIS — M19.042 PRIMARY OSTEOARTHRITIS OF BOTH HANDS: ICD-10-CM

## 2020-10-29 DIAGNOSIS — M19.041 PRIMARY OSTEOARTHRITIS OF BOTH HANDS: ICD-10-CM

## 2020-10-29 PROCEDURE — 99214 OFFICE O/P EST MOD 30 MIN: CPT | Mod: 95,CR | Performed by: FAMILY MEDICINE

## 2020-10-29 RX ORDER — MELOXICAM 15 MG/1
TABLET ORAL
COMMUNITY
Start: 2020-10-15 | End: 2021-09-15

## 2020-10-29 RX ORDER — DEXTROAMPHETAMINE SACCHARATE, AMPHETAMINE ASPARTATE MONOHYDRATE, DEXTROAMPHETAMINE SULFATE AND AMPHETAMINE SULFATE 6.25; 6.25; 6.25; 6.25 MG/1; MG/1; MG/1; MG/1
CAPSULE, EXTENDED RELEASE ORAL
COMMUNITY
Start: 2020-08-14 | End: 2022-06-07

## 2020-10-29 RX ORDER — METHYLPREDNISOLONE 4 MG/1
TABLET ORAL
COMMUNITY
Start: 2020-09-08 | End: 2020-10-28

## 2020-10-29 SDOH — HEALTH STABILITY: MENTAL HEALTH: HOW MANY STANDARD DRINKS CONTAINING ALCOHOL DO YOU HAVE ON A TYPICAL DAY?: 1 OR 2

## 2020-10-29 SDOH — HEALTH STABILITY: MENTAL HEALTH: HOW OFTEN DO YOU HAVE A DRINK CONTAINING ALCOHOL?: 2-4 TIMES A MONTH

## 2020-10-29 NOTE — PROGRESS NOTES
Chief Complaint   Patient presents with   • Hyperlipidemia     lab results       HISTORY OF PRESENT ILLNESS: Patient is a 50 y.o. female established patient here today for the following concerns:    This encounter was conducted via Zoom .   Verbal consent was obtained. Patient's identity was verified.      1. Primary osteoarthritis of both hands  Requests refill on voltaren gel.      2. Other specified hypothyroidism  Continues on levothyroxine and liothyronine.  Feeling well on this dose.  Most recent labs show suppressed TSH but normal free hormones.      3. Mixed hyperlipidemia  Lipids are elevated again.  She has in the past been successful at reducing and controlling it with diet.  She would like to try this again before starting statin therapy.  No CP, SOB.  No hx of CAD.      4. History of breast cancer  5. Breast pain  She is s/p breast cancer with mastectomy and reconstruction.  She has continued with some breast pain and is interested in additional screening for possible implant rupture.  Had US which was benign appearing but inconclusive for rupture.        Past Medical, Social, and Family history reviewed and updated in EPIC    Allergies:Erythromycin    Current Outpatient Medications   Medication Sig Dispense Refill   • Diclofenac Sodium (VOLTAREN) 1 % Gel Apply 2 g to skin as directed 3 times a day as needed. 150 g 0   • amphetamine-dextroamphetamine (ADDERALL XR) 25 MG XR capsule      • fluticasone (FLONASE) 50 MCG/ACT nasal spray Spray 2 Sprays in nose every day. Each Nostril 16 g 11   • levothyroxine (SYNTHROID) 100 MCG Tab Take 1 Tab by mouth Every morning on an empty stomach. 90 Tab 1   • liothyronine (CYTOMEL) 5 MCG Tab TAKE 1 TABLET TWICE A  Tab 0   • ibuprofen (MOTRIN) 600 MG Tab      • acetaminophen (TYLENOL) 325 MG Tab Take 2 Tabs by mouth every four hours as needed for Mild Pain, Moderate Pain or Fever. 270 Tab 3   • ibuprofen (MOTRIN) 200 MG Tab Take 600 mg by mouth every 6 hours as  "needed.     • VITAMIN D, ERGOCALCIFEROL, PO Take  by mouth every day.       • Multiple Vitamin (MULTIVITAMIN PO) Take  by mouth every day.       • Calcium Carbonate-Vitamin D (CALCIUM + D PO) Take  by mouth every day.       • meloxicam (MOBIC) 15 MG tablet        No current facility-administered medications for this visit.          ROS:  Review of Systems   Constitutional: Negative for fever, chills, weight loss and malaise/fatigue.   HENT: Negative for ear pain, nosebleeds, congestion, sore throat and neck pain.    Eyes: Negative for blurred vision.   Respiratory: Negative for cough, sputum production, shortness of breath and wheezing.    Cardiovascular: Negative for chest pain, palpitations,  and leg swelling.   Gastrointestinal: Negative for heartburn, nausea, vomiting, diarrhea and abdominal pain.   Genitourinary: Negative for dysuria, urgency and frequency.   Musculoskeletal: Negative for myalgias, back pain and joint pain.   Skin: Negative for rash and itching.   Neurological: Negative for dizziness, tingling, tremors, sensory change, focal weakness and headaches.   Endo/Heme/Allergies: Does not bruise/bleed easily.   Psychiatric/Behavioral: Negative for depression, anxiety, suicidal ideas, insomnia and memory loss.      Exam:  Ht 1.676 m (5' 6\")   SpO2 99%     General:  Well nourished, well developed in NAD  Head is grossly normal.  Neck: Supple without JVD, Trachea midline, no thyromegaly noted  Pulmonary:  Normal effort. Speaking in full sentences  Psych: affect appropriate  Skin: no Jaundice noted or facial rashes    Please note that this dictation was created using voice recognition software. I have made every reasonable attempt to correct obvious errors, but I expect that there are errors of grammar and possibly content that I did not discover before finalizing the note.    Assessment/Plan:  1. Primary osteoarthritis of both hands  - Diclofenac Sodium (VOLTAREN) 1 % Gel; Apply 2 g to skin as directed 3 " times a day as needed.  Dispense: 150 g; Refill: 0    2. Other specified hypothyroidism  - TRIIDOTHYRONINE; Standing  - TSH; Standing  - FREE THYROXINE; Standing  Continue current dosing    3. Mixed hyperlipidemia  - Comp Metabolic Panel; Future  - Lipid Profile; Future  TLC's recommended.  Recheck in 6 months     4. History of breast cancer  - MR-BREAST-BILATERAL-WITH & W/O; Future    5. Breast pain  - MR-BREAST-BILATERAL-WITH & W/O; Future    Follow up in 6 months

## 2020-11-06 ENCOUNTER — TELEPHONE (OUTPATIENT)
Dept: MEDICAL GROUP | Facility: PHYSICIAN GROUP | Age: 50
End: 2020-11-06

## 2020-11-06 NOTE — TELEPHONE ENCOUNTER
[facility] was contacted. They are behind and will giver her a call today.  Patient notified via [employer].

## 2020-11-06 NOTE — TELEPHONE ENCOUNTER
----- Message from Gloria Joseph M.D. sent at 10/29/2020  3:19 PM PDT -----  PFTs did not get scheduled, patient did not get called.  Please see who we would contact for this.  We just want PFTs not pulmonology consult.

## 2020-12-02 DIAGNOSIS — E03.8 OTHER SPECIFIED HYPOTHYROIDISM: ICD-10-CM

## 2020-12-02 RX ORDER — LIOTHYRONINE SODIUM 5 UG/1
TABLET ORAL
Qty: 180 TAB | Refills: 1 | Status: SHIPPED | OUTPATIENT
Start: 2020-12-02

## 2020-12-10 ENCOUNTER — HOSPITAL ENCOUNTER (OUTPATIENT)
Dept: RADIOLOGY | Facility: MEDICAL CENTER | Age: 50
End: 2020-12-10
Attending: FAMILY MEDICINE
Payer: COMMERCIAL

## 2020-12-10 DIAGNOSIS — Z85.3 HISTORY OF BREAST CANCER: ICD-10-CM

## 2020-12-10 DIAGNOSIS — N64.4 BREAST PAIN: ICD-10-CM

## 2020-12-10 PROCEDURE — A9576 INJ PROHANCE MULTIPACK: HCPCS | Performed by: FAMILY MEDICINE

## 2020-12-10 PROCEDURE — 700117 HCHG RX CONTRAST REV CODE 255: Performed by: FAMILY MEDICINE

## 2020-12-10 PROCEDURE — C8908 MRI W/O FOL W/CONT, BREAST,: HCPCS

## 2020-12-10 RX ADMIN — GADOTERIDOL 15 ML: 279.3 INJECTION, SOLUTION INTRAVENOUS at 09:28

## 2020-12-11 ENCOUNTER — HOSPITAL ENCOUNTER (OUTPATIENT)
Dept: PULMONOLOGY | Facility: MEDICAL CENTER | Age: 50
End: 2020-12-11
Attending: FAMILY MEDICINE
Payer: COMMERCIAL

## 2020-12-11 DIAGNOSIS — R05.3 CHRONIC COUGH: ICD-10-CM

## 2020-12-11 PROCEDURE — 94729 DIFFUSING CAPACITY: CPT | Mod: 26 | Performed by: INTERNAL MEDICINE

## 2020-12-11 PROCEDURE — 700102 HCHG RX REV CODE 250 W/ 637 OVERRIDE(OP): Performed by: FAMILY MEDICINE

## 2020-12-11 PROCEDURE — 94726 PLETHYSMOGRAPHY LUNG VOLUMES: CPT

## 2020-12-11 PROCEDURE — 94729 DIFFUSING CAPACITY: CPT

## 2020-12-11 PROCEDURE — 94060 EVALUATION OF WHEEZING: CPT

## 2020-12-11 PROCEDURE — A9270 NON-COVERED ITEM OR SERVICE: HCPCS | Performed by: FAMILY MEDICINE

## 2020-12-11 PROCEDURE — 94060 EVALUATION OF WHEEZING: CPT | Mod: 26 | Performed by: INTERNAL MEDICINE

## 2020-12-11 PROCEDURE — 94726 PLETHYSMOGRAPHY LUNG VOLUMES: CPT | Mod: 26 | Performed by: INTERNAL MEDICINE

## 2020-12-11 RX ORDER — ALBUTEROL SULFATE 90 UG/1
2 AEROSOL, METERED RESPIRATORY (INHALATION)
Status: DISCONTINUED | OUTPATIENT
Start: 2020-12-11 | End: 2020-12-12 | Stop reason: HOSPADM

## 2020-12-11 ASSESSMENT — PULMONARY FUNCTION TESTS
FEV1/FVC_PERCENT_PREDICTED: 80
FEV1/FVC: 79.02
FVC_PERCENT_PREDICTED: 112
FEV1_PREDICTED: 2.98
FEV1/FVC_PREDICTED: 80.41
FEV1/FVC_PERCENT_PREDICTED: 104
FVC: 4.21
FEV1_PERCENT_PREDICTED: 116
FEV1: 3.36
FEV1/FVC: 82.15
FEV1/FVC_PERCENT_PREDICTED: 99
FEV1/FVC_PERCENT_CHANGE: -200
FEV1/FVC_PERCENT_CHANGE: 3
FEV1/FVC: 82.19
FEV1_PERCENT_PREDICTED: 112
FEV1_LLN: 2.49
FVC_LLN: 3.11
FEV1_PERCENT_CHANGE: -1
FEV1/FVC_PERCENT_LLN: 67.15
FEV1/FVC_PERCENT_LLN: 67.15
FEV1/FVC_PERCENT_PREDICTED: 98
FEV1: 3.46
FEV1/FVC_PERCENT_PREDICTED: 102
FVC_LLN: 3.11
FEV1/FVC: 79
FEV1_PERCENT_CHANGE: 2
FVC_PREDICTED: 3.73
FEV1_LLN: 2.49
FVC: 4.25
FVC_PERCENT_PREDICTED: 113

## 2020-12-14 NOTE — PROCEDURES
DATE OF SERVICE:  12/11/2020     PULMONARY FUNCTION TEST INTERPRETATION     INTERPRETATION:  The patient's FEV1/FVC ratio was 79% of predicted, which does   not signify any obstructive lung disease.  There was no positive   bronchodilator response.  Her total lung capacity was 134% of predicted.  The   diffusion capacity was 136% of predicted and normal.     In summary, this is normal pulmonary function testing with no evidence of   obstructive, restrictive or any problems with the diffusion capacity.        ______________________________  MD ANTONIO Delaney/DENG/FLAKITO    DD:  12/13/2020 15:35  DT:  12/13/2020 15:50    Job#:  [id number]

## 2020-12-15 ENCOUNTER — TELEPHONE (OUTPATIENT)
Dept: MEDICAL GROUP | Facility: PHYSICIAN GROUP | Age: 50
End: 2020-12-15

## 2020-12-15 NOTE — TELEPHONE ENCOUNTER
----- Message from Gloria Joseph M.D. sent at 12/15/2020  7:28 AM PST -----  Normal pulmonary function testing.

## 2020-12-24 RX ORDER — CEPHALEXIN 500 MG/1
500 CAPSULE ORAL 4 TIMES DAILY
Qty: 28 CAP | Refills: 0 | Status: SHIPPED | OUTPATIENT
Start: 2020-12-24 | End: 2020-12-28 | Stop reason: SDUPTHER

## 2020-12-28 RX ORDER — CEPHALEXIN 500 MG/1
500 CAPSULE ORAL 4 TIMES DAILY
Qty: 28 CAP | Refills: 0 | Status: SHIPPED | OUTPATIENT
Start: 2020-12-28 | End: 2022-06-07

## 2021-03-09 ENCOUNTER — TELEPHONE (OUTPATIENT)
Dept: MEDICAL GROUP | Facility: PHYSICIAN GROUP | Age: 51
End: 2021-03-09

## 2021-03-09 DIAGNOSIS — E03.8 OTHER SPECIFIED HYPOTHYROIDISM: ICD-10-CM

## 2021-03-09 RX ORDER — LEVOTHYROXINE SODIUM 0.1 MG/1
100 TABLET ORAL
Qty: 90 TABLET | Refills: 0 | Status: SHIPPED | OUTPATIENT
Start: 2021-03-09 | End: 2021-05-27

## 2021-06-03 ENCOUNTER — APPOINTMENT (RX ONLY)
Dept: URBAN - METROPOLITAN AREA CLINIC 35 | Facility: CLINIC | Age: 51
Setting detail: DERMATOLOGY
End: 2021-06-03

## 2021-06-03 DIAGNOSIS — L82.1 OTHER SEBORRHEIC KERATOSIS: ICD-10-CM

## 2021-06-03 DIAGNOSIS — L81.4 OTHER MELANIN HYPERPIGMENTATION: ICD-10-CM

## 2021-06-03 DIAGNOSIS — L57.3 POIKILODERMA OF CIVATTE: ICD-10-CM | Status: INADEQUATELY CONTROLLED

## 2021-06-03 DIAGNOSIS — D22 MELANOCYTIC NEVI: ICD-10-CM

## 2021-06-03 DIAGNOSIS — L90.0 LICHEN SCLEROSUS ET ATROPHICUS: ICD-10-CM | Status: INADEQUATELY CONTROLLED

## 2021-06-03 DIAGNOSIS — D18.0 HEMANGIOMA: ICD-10-CM

## 2021-06-03 DIAGNOSIS — L70.0 ACNE VULGARIS: ICD-10-CM | Status: INADEQUATELY CONTROLLED

## 2021-06-03 DIAGNOSIS — Z71.89 OTHER SPECIFIED COUNSELING: ICD-10-CM

## 2021-06-03 PROBLEM — D22.61 MELANOCYTIC NEVI OF RIGHT UPPER LIMB, INCLUDING SHOULDER: Status: ACTIVE | Noted: 2021-06-03

## 2021-06-03 PROBLEM — D18.01 HEMANGIOMA OF SKIN AND SUBCUTANEOUS TISSUE: Status: ACTIVE | Noted: 2021-06-03

## 2021-06-03 PROCEDURE — ? TREATMENT REGIMEN

## 2021-06-03 PROCEDURE — ? PRESCRIPTION

## 2021-06-03 PROCEDURE — 99214 OFFICE O/P EST MOD 30 MIN: CPT

## 2021-06-03 PROCEDURE — ? SUNSCREEN RECOMMENDATIONS

## 2021-06-03 PROCEDURE — ? COUNSELING

## 2021-06-03 PROCEDURE — ? ADDITIONAL NOTES

## 2021-06-03 PROCEDURE — ? OBSERVATION AND MEASURE

## 2021-06-03 RX ORDER — HYDROQUINONE 4 %
CREAM (GRAM) TOPICAL
Qty: 1 | Refills: 0 | Status: CANCELLED
Stop reason: CLARIF

## 2021-06-03 ASSESSMENT — LOCATION DETAILED DESCRIPTION DERM
LOCATION DETAILED: RIGHT LABIA MAJORA
LOCATION DETAILED: LEFT LABIA MAJORA
LOCATION DETAILED: RIGHT LATERAL SUPERIOR CHEST
LOCATION DETAILED: LEFT MEDIAL UPPER BACK
LOCATION DETAILED: RIGHT CENTRAL LATERAL NECK
LOCATION DETAILED: EPIGASTRIC SKIN
LOCATION DETAILED: RIGHT POSTERIOR SHOULDER

## 2021-06-03 ASSESSMENT — LOCATION ZONE DERM
LOCATION ZONE: NECK
LOCATION ZONE: TRUNK
LOCATION ZONE: ARM
LOCATION ZONE: VULVA

## 2021-06-03 ASSESSMENT — LOCATION SIMPLE DESCRIPTION DERM
LOCATION SIMPLE: LEFT UPPER BACK
LOCATION SIMPLE: ABDOMEN
LOCATION SIMPLE: NECK
LOCATION SIMPLE: CHEST
LOCATION SIMPLE: VULVA
LOCATION SIMPLE: RIGHT SHOULDER

## 2021-06-03 NOTE — PROCEDURE: ADDITIONAL NOTES
Additional Notes: Hydroquinone rx printed for patient
Detail Level: Zone
Additional Notes: Ptwillmake an appointment to freeze when she is at her lightest
Render Risk Assessment In Note?: no
Additional Notes: Advise AnteAge VRS

## 2021-06-03 NOTE — PROCEDURE: COUNSELING
Birth Control Pills Counseling: Birth Control Pill Counseling: I discussed with the patient the potential side effects of OCPs including but not limited to increased risk of stroke, heart attack, thrombophlebitis, deep venous thrombosis, hepatic adenomas, breast changes, GI upset, headaches, and depression.  The patient verbalized understanding of the proper use and possible adverse effects of OCPs. All of the patient's questions and concerns were addressed.
Doxycycline Counseling:  Patient counseled regarding possible photosensitivity and increased risk for sunburn.  Patient instructed to avoid sunlight, if possible.  When exposed to sunlight, patients should wear protective clothing, sunglasses, and sunscreen.  The patient was instructed to call the office immediately if the following severe adverse effects occur:  hearing changes, easy bruising/bleeding, severe headache, or vision changes.  The patient verbalized understanding of the proper use and possible adverse effects of doxycycline.  All of the patient's questions and concerns were addressed.
Isotretinoin Counseling: Patient should get monthly blood tests, not donate blood, not drive at night if vision affected, not share medication, and not undergo elective surgery for 6 months after tx completed. Side effects reviewed, pt to contact office should one occur.
Detail Level: Zone
Minocycline Counseling: Patient advised regarding possible photosensitivity and discoloration of the teeth, skin, lips, tongue and gums.  Patient instructed to avoid sunlight, if possible.  When exposed to sunlight, patients should wear protective clothing, sunglasses, and sunscreen.  The patient was instructed to call the office immediately if the following severe adverse effects occur:  hearing changes, easy bruising/bleeding, severe headache, or vision changes.  The patient verbalized understanding of the proper use and possible adverse effects of minocycline.  All of the patient's questions and concerns were addressed.
Spironolactone Counseling: Patient advised regarding risks of diarrhea, abdominal pain, hyperkalemia, birth defects (for female patients), liver toxicity and renal toxicity. The patient may need blood work to monitor liver and kidney function and potassium levels while on therapy. The patient verbalized understanding of the proper use and possible adverse effects of spironolactone.  All of the patient's questions and concerns were addressed.
Include Pregnancy/Lactation Warning?: No
Sarecycline Pregnancy And Lactation Text: This medication is Pregnancy Category D and not consider safe during pregnancy. It is also excreted in breast milk.
Topical Retinoid Pregnancy And Lactation Text: This medication is Pregnancy Category C. It is unknown if this medication is excreted in breast milk.
Topical Clindamycin Pregnancy And Lactation Text: This medication is Pregnancy Category B and is considered safe during pregnancy. It is unknown if it is excreted in breast milk.
Azithromycin Counseling:  I discussed with the patient the risks of azithromycin including but not limited to GI upset, allergic reaction, drug rash, diarrhea, and yeast infections.
Birth Control Pills Pregnancy And Lactation Text: This medication should be avoided if pregnant and for the first 30 days post-partum.
Doxycycline Pregnancy And Lactation Text: This medication is Pregnancy Category D and not consider safe during pregnancy. It is also excreted in breast milk but is considered safe for shorter treatment courses.
Isotretinoin Pregnancy And Lactation Text: This medication is Pregnancy Category X and is considered extremely dangerous during pregnancy. It is unknown if it is excreted in breast milk.
Benzoyl Peroxide Counseling: Patient counseled that medicine may cause skin irritation and bleach clothing.  In the event of skin irritation, the patient was advised to reduce the amount of the drug applied or use it less frequently.   The patient verbalized understanding of the proper use and possible adverse effects of benzoyl peroxide.  All of the patient's questions and concerns were addressed.
Tazorac Counseling:  Patient advised that medication is irritating and drying.  Patient may need to apply sparingly and wash off after an hour before eventually leaving it on overnight.  The patient verbalized understanding of the proper use and possible adverse effects of tazorac.  All of the patient's questions and concerns were addressed.
Topical Sulfur Applications Counseling: Topical Sulfur Counseling: Patient counseled that this medication may cause skin irritation or allergic reactions.  In the event of skin irritation, the patient was advised to reduce the amount of the drug applied or use it less frequently.   The patient verbalized understanding of the proper use and possible adverse effects of topical sulfur application.  All of the patient's questions and concerns were addressed.
Azithromycin Pregnancy And Lactation Text: This medication is considered safe during pregnancy and is also secreted in breast milk.
Dapsone Counseling: I discussed with the patient the risks of dapsone including but not limited to hemolytic anemia, agranulocytosis, rashes, methemoglobinemia, kidney failure, peripheral neuropathy, headaches, GI upset, and liver toxicity.  Patients who start dapsone require monitoring including baseline LFTs and weekly CBCs for the first month, then every month thereafter.  The patient verbalized understanding of the proper use and possible adverse effects of dapsone.  All of the patient's questions and concerns were addressed.
Erythromycin Counseling:  I discussed with the patient the risks of erythromycin including but not limited to GI upset, allergic reaction, drug rash, diarrhea, increase in liver enzymes, and yeast infections.
High Dose Vitamin A Counseling: Side effects reviewed, pt to contact office should one occur.
Sarecycline Counseling: Patient advised regarding possible photosensitivity and discoloration of the teeth, skin, lips, tongue and gums.  Patient instructed to avoid sunlight, if possible.  When exposed to sunlight, patients should wear protective clothing, sunglasses, and sunscreen.  The patient was instructed to call the office immediately if the following severe adverse effects occur:  hearing changes, easy bruising/bleeding, severe headache, or vision changes.  The patient verbalized understanding of the proper use and possible adverse effects of sarecycline.  All of the patient's questions and concerns were addressed.
Tetracycline Counseling: Patient counseled regarding possible photosensitivity and increased risk for sunburn.  Patient instructed to avoid sunlight, if possible.  When exposed to sunlight, patients should wear protective clothing, sunglasses, and sunscreen.  The patient was instructed to call the office immediately if the following severe adverse effects occur:  hearing changes, easy bruising/bleeding, severe headache, or vision changes.  The patient verbalized understanding of the proper use and possible adverse effects of tetracycline.  All of the patient's questions and concerns were addressed. Patient understands to avoid pregnancy while on therapy due to potential birth defects.
Spironolactone Pregnancy And Lactation Text: This medication can cause feminization of the male fetus and should be avoided during pregnancy. The active metabolite is also found in breast milk.
Benzoyl Peroxide Pregnancy And Lactation Text: This medication is Pregnancy Category C. It is unknown if benzoyl peroxide is excreted in breast milk.
Tazorac Pregnancy And Lactation Text: This medication is not safe during pregnancy. It is unknown if this medication is excreted in breast milk.
Topical Sulfur Applications Pregnancy And Lactation Text: This medication is Pregnancy Category C and has an unknown safety profile during pregnancy. It is unknown if this topical medication is excreted in breast milk.
Bactrim Counseling:  I discussed with the patient the risks of sulfa antibiotics including but not limited to GI upset, allergic reaction, drug rash, diarrhea, dizziness, photosensitivity, and yeast infections.  Rarely, more serious reactions can occur including but not limited to aplastic anemia, agranulocytosis, methemoglobinemia, blood dyscrasias, liver or kidney failure, lung infiltrates or desquamative/blistering drug rashes.
Bactrim Pregnancy And Lactation Text: This medication is Pregnancy Category D and is known to cause fetal risk.  It is also excreted in breast milk.
Dapsone Pregnancy And Lactation Text: This medication is Pregnancy Category C and is not considered safe during pregnancy or breast feeding.
Erythromycin Pregnancy And Lactation Text: This medication is Pregnancy Category B and is considered safe during pregnancy. It is also excreted in breast milk.
High Dose Vitamin A Pregnancy And Lactation Text: High dose vitamin A therapy is contraindicated during pregnancy and breast feeding.
Topical Retinoid counseling:  Patient advised to apply a pea-sized amount only at bedtime and wait 30 minutes after washing their face before applying.  If too drying, patient may add a non-comedogenic moisturizer. The patient verbalized understanding of the proper use and possible adverse effects of retinoids.  All of the patient's questions and concerns were addressed.
Topical Clindamycin Counseling: Patient counseled that this medication may cause skin irritation or allergic reactions.  In the event of skin irritation, the patient was advised to reduce the amount of the drug applied or use it less frequently.   The patient verbalized understanding of the proper use and possible adverse effects of clindamycin.  All of the patient's questions and concerns were addressed.
Detail Level: Detailed
Detail Level: Generalized

## 2021-06-03 NOTE — PROCEDURE: TREATMENT REGIMEN
Detail Level: Zone
Initiate Treatment: Tretinoin 0.025% gel apply nightly to face
Initiate Treatment: AnteAge VRS
Continue Regimen: Miranda Noris\\nClobetasol ointment
Detail Level: Simple

## 2021-06-17 DIAGNOSIS — E03.8 OTHER SPECIFIED HYPOTHYROIDISM: ICD-10-CM

## 2021-06-21 RX ORDER — LEVOTHYROXINE SODIUM 100 MCG
TABLET ORAL
Qty: 30 TABLET | Refills: 0 | OUTPATIENT
Start: 2021-06-21

## 2021-12-14 ENCOUNTER — APPOINTMENT (RX ONLY)
Dept: URBAN - METROPOLITAN AREA CLINIC 35 | Facility: CLINIC | Age: 51
Setting detail: DERMATOLOGY
End: 2021-12-14

## 2021-12-14 DIAGNOSIS — Z71.89 OTHER SPECIFIED COUNSELING: ICD-10-CM

## 2021-12-14 DIAGNOSIS — B07.8 OTHER VIRAL WARTS: ICD-10-CM

## 2021-12-14 DIAGNOSIS — L82.1 OTHER SEBORRHEIC KERATOSIS: ICD-10-CM

## 2021-12-14 PROCEDURE — ? SUNSCREEN RECOMMENDATIONS

## 2021-12-14 PROCEDURE — ? COUNSELING

## 2021-12-14 PROCEDURE — ? ADDITIONAL NOTES

## 2021-12-14 PROCEDURE — ? LIQUID NITROGEN (COSMETIC)

## 2021-12-14 PROCEDURE — ? LIQUID NITROGEN (CM)

## 2021-12-14 ASSESSMENT — LOCATION ZONE DERM
LOCATION ZONE: FACE
LOCATION ZONE: FINGER
LOCATION ZONE: TRUNK
LOCATION ZONE: ARM
LOCATION ZONE: NECK
LOCATION ZONE: HAND

## 2021-12-14 ASSESSMENT — LOCATION SIMPLE DESCRIPTION DERM
LOCATION SIMPLE: RIGHT ANTERIOR NECK
LOCATION SIMPLE: LEFT INDEX FINGER
LOCATION SIMPLE: LEFT HAND
LOCATION SIMPLE: LEFT LOWER BACK
LOCATION SIMPLE: RIGHT FOREARM
LOCATION SIMPLE: RIGHT CHEEK
LOCATION SIMPLE: LEFT SHOULDER
LOCATION SIMPLE: POSTERIOR NECK
LOCATION SIMPLE: LEFT ANTERIOR NECK
LOCATION SIMPLE: LEFT FOREARM

## 2021-12-14 ASSESSMENT — LOCATION DETAILED DESCRIPTION DERM
LOCATION DETAILED: LEFT ANTERIOR SHOULDER
LOCATION DETAILED: LEFT VENTRAL PROXIMAL FOREARM
LOCATION DETAILED: LEFT DORSAL MIDDLE METACARPOPHALANGEAL JOINT
LOCATION DETAILED: LEFT RADIAL DORSAL HAND
LOCATION DETAILED: RIGHT INFERIOR LATERAL NECK
LOCATION DETAILED: LEFT INFERIOR LATERAL MIDBACK
LOCATION DETAILED: RIGHT SUPERIOR ANTERIOR NECK
LOCATION DETAILED: LEFT INFERIOR LATERAL NECK
LOCATION DETAILED: RIGHT SUPERIOR CENTRAL MALAR CHEEK
LOCATION DETAILED: LEFT PROXIMAL DORSAL FOREARM
LOCATION DETAILED: RIGHT PROXIMAL ULNAR DORSAL FOREARM
LOCATION DETAILED: LEFT LATERAL NECK
LOCATION DETAILED: LEFT DISTAL DORSAL FOREARM
LOCATION DETAILED: RIGHT PROXIMAL DORSAL FOREARM
LOCATION DETAILED: RIGHT DISTAL DORSAL FOREARM
LOCATION DETAILED: LEFT SUPERIOR LATERAL LOWER BACK
LOCATION DETAILED: LEFT INFERIOR ANTERIOR NECK
LOCATION DETAILED: LEFT VENTRAL DISTAL FOREARM
LOCATION DETAILED: LEFT DISTAL PALMAR INDEX FINGER

## 2021-12-14 NOTE — PROCEDURE: LIQUID NITROGEN
Add 52 Modifier (Optional): no
Consent: The patient's consent was obtained including but not limited to risks of crusting, scabbing, blistering, scarring, darker or lighter pigmentary change, recurrence, incomplete removal and infection.
Medical Necessity Information: It is in your best interest to select a reason for this procedure from the list below. All of these items fulfill various CMS LCD requirements except the new and changing color options.
Medical Necessity Clause: This procedure was medically necessary because the lesions that were treated were:
Post-Care Instructions: I reviewed with the patient in detail post-care instructions. Patient is to wear sunprotection, and avoid picking at any of the treated lesions. Pt may apply Vaseline to crusted or scabbing areas.
Detail Level: Detailed
Size Of Lesion In Cm (Optional): 0

## 2021-12-14 NOTE — PROCEDURE: MIPS QUALITY
Quality 130: Documentation Of Current Medications In The Medical Record: Current Medications Documented
Quality 402: Tobacco Use And Help With Quitting Among Adolescents: Patient screened for tobacco and never smoked
Detail Level: Generalized
Quality 226: Preventive Care And Screening: Tobacco Use: Screening And Cessation Intervention: Patient screened for tobacco use and is an ex/non-smoker

## 2021-12-14 NOTE — PROCEDURE: LIQUID NITROGEN (COSMETIC)
Billing Information: Bill by Static Price
Detail Level: Detailed
Price (Use Numbers Only, No Special Characters Or $): 0.00
Render Post-Care Instructions In Note?: no
Post-Care Instructions: I reviewed with the patient in detail post-care instructions. Patient is to wear sunprotection, and avoid picking at any of the treated lesions. Pt may apply Vaseline to crusted or scabbing areas.
Consent: The patient's consent was obtained including but not limited to risks of crusting, scabbing, blistering, scarring, darker or lighter pigmentary change, recurrence, incomplete removal and infection. The patient understands that the procedure is cosmetic in nature and is not covered by insurance.
Price (Use Numbers Only, No Special Characters Or $): 200.00

## 2022-06-08 ENCOUNTER — APPOINTMENT (RX ONLY)
Dept: URBAN - METROPOLITAN AREA CLINIC 35 | Facility: CLINIC | Age: 52
Setting detail: DERMATOLOGY
End: 2022-06-08

## 2022-06-08 DIAGNOSIS — D18.0 HEMANGIOMA: ICD-10-CM

## 2022-06-08 DIAGNOSIS — L70.0 ACNE VULGARIS: ICD-10-CM | Status: INADEQUATELY CONTROLLED

## 2022-06-08 DIAGNOSIS — L81.4 OTHER MELANIN HYPERPIGMENTATION: ICD-10-CM

## 2022-06-08 DIAGNOSIS — L57.3 POIKILODERMA OF CIVATTE: ICD-10-CM

## 2022-06-08 DIAGNOSIS — D22 MELANOCYTIC NEVI: ICD-10-CM

## 2022-06-08 DIAGNOSIS — L82.1 OTHER SEBORRHEIC KERATOSIS: ICD-10-CM

## 2022-06-08 DIAGNOSIS — Z71.89 OTHER SPECIFIED COUNSELING: ICD-10-CM

## 2022-06-08 DIAGNOSIS — L90.0 LICHEN SCLEROSUS ET ATROPHICUS: ICD-10-CM | Status: INADEQUATELY CONTROLLED

## 2022-06-08 PROBLEM — D22.5 MELANOCYTIC NEVI OF TRUNK: Status: ACTIVE | Noted: 2022-06-08

## 2022-06-08 PROBLEM — D18.01 HEMANGIOMA OF SKIN AND SUBCUTANEOUS TISSUE: Status: ACTIVE | Noted: 2022-06-08

## 2022-06-08 PROCEDURE — ? ADDITIONAL NOTES

## 2022-06-08 PROCEDURE — ? TREATMENT REGIMEN

## 2022-06-08 PROCEDURE — ? PRESCRIPTION

## 2022-06-08 PROCEDURE — ? COUNSELING

## 2022-06-08 PROCEDURE — ? OBSERVATION AND MEASURE

## 2022-06-08 PROCEDURE — ? SUNSCREEN RECOMMENDATIONS

## 2022-06-08 PROCEDURE — 99214 OFFICE O/P EST MOD 30 MIN: CPT

## 2022-06-08 RX ORDER — TRETIONIN 0.25 MG/G
CREAM TOPICAL
Qty: 45 | Refills: 11 | Status: ERX | COMMUNITY
Start: 2022-06-08

## 2022-06-08 RX ADMIN — TRETIONIN 1: 0.25 CREAM TOPICAL at 00:00

## 2022-06-08 ASSESSMENT — LOCATION ZONE DERM
LOCATION ZONE: VULVA
LOCATION ZONE: TRUNK
LOCATION ZONE: NECK

## 2022-06-08 ASSESSMENT — LOCATION SIMPLE DESCRIPTION DERM
LOCATION SIMPLE: LEFT UPPER BACK
LOCATION SIMPLE: VULVA
LOCATION SIMPLE: NECK
LOCATION SIMPLE: RIGHT UPPER BACK
LOCATION SIMPLE: CHEST
LOCATION SIMPLE: ABDOMEN

## 2022-06-08 ASSESSMENT — LOCATION DETAILED DESCRIPTION DERM
LOCATION DETAILED: RIGHT INFERIOR UPPER BACK
LOCATION DETAILED: RIGHT CENTRAL LATERAL NECK
LOCATION DETAILED: LEFT LABIA MAJORA
LOCATION DETAILED: LEFT MEDIAL UPPER BACK
LOCATION DETAILED: EPIGASTRIC SKIN
LOCATION DETAILED: RIGHT LATERAL SUPERIOR CHEST
LOCATION DETAILED: LEFT RIB CAGE
LOCATION DETAILED: RIGHT LABIA MAJORA

## 2022-06-08 NOTE — PROCEDURE: TREATMENT REGIMEN
Detail Level: Zone
Continue Regimen: Tretinoin 0.025% gel apply nightly to face
Continue Regimen: Clobetasol ointment twice a day as needed
Otc Regimen: AnteAge VRS \\Rody Hamm
Detail Level: Simple
Continue Regimen: Hydroquinone 4% cream apply to affected skin twice a day x2 months take 1 month. Repeat must use with sunscreen
Detail Level: Detailed

## 2022-06-10 RX ORDER — TRETIONIN 0.25 MG/G
1 CREAM TOPICAL
Qty: 45 | Refills: 11 | Status: ERX

## 2023-06-13 ENCOUNTER — APPOINTMENT (RX ONLY)
Dept: URBAN - METROPOLITAN AREA CLINIC 35 | Facility: CLINIC | Age: 53
Setting detail: DERMATOLOGY
End: 2023-06-13

## 2023-06-13 DIAGNOSIS — L81.4 OTHER MELANIN HYPERPIGMENTATION: ICD-10-CM

## 2023-06-13 DIAGNOSIS — L57.3 POIKILODERMA OF CIVATTE: ICD-10-CM

## 2023-06-13 DIAGNOSIS — L82.1 OTHER SEBORRHEIC KERATOSIS: ICD-10-CM

## 2023-06-13 DIAGNOSIS — D18.0 HEMANGIOMA: ICD-10-CM

## 2023-06-13 DIAGNOSIS — Z71.89 OTHER SPECIFIED COUNSELING: ICD-10-CM

## 2023-06-13 DIAGNOSIS — D22 MELANOCYTIC NEVI: ICD-10-CM

## 2023-06-13 PROBLEM — D22.9 MELANOCYTIC NEVI, UNSPECIFIED: Status: ACTIVE | Noted: 2023-06-13

## 2023-06-13 PROBLEM — D18.01 HEMANGIOMA OF SKIN AND SUBCUTANEOUS TISSUE: Status: ACTIVE | Noted: 2023-06-13

## 2023-06-13 PROBLEM — D23.62 OTHER BENIGN NEOPLASM OF SKIN OF LEFT UPPER LIMB, INCLUDING SHOULDER: Status: ACTIVE | Noted: 2023-06-13

## 2023-06-13 PROCEDURE — ? TREATMENT REGIMEN

## 2023-06-13 PROCEDURE — ? COUNSELING

## 2023-06-13 PROCEDURE — 99213 OFFICE O/P EST LOW 20 MIN: CPT

## 2023-06-13 PROCEDURE — ? OBSERVATION AND MEASURE

## 2023-06-13 ASSESSMENT — LOCATION DETAILED DESCRIPTION DERM
LOCATION DETAILED: RIGHT CENTRAL LATERAL NECK
LOCATION DETAILED: LEFT LATERAL INFERIOR EYELID

## 2023-06-13 ASSESSMENT — LOCATION ZONE DERM
LOCATION ZONE: EYELID
LOCATION ZONE: NECK

## 2023-06-13 ASSESSMENT — LOCATION SIMPLE DESCRIPTION DERM
LOCATION SIMPLE: LEFT INFERIOR EYELID
LOCATION SIMPLE: NECK

## 2023-06-13 NOTE — PROCEDURE: OBSERVATION
Detail Level: Detailed
Morphology Per Location (Optional): patch of hyperpigmentation with surrounding tank and hypo pigmented macules
Size Of Lesion: 1.3 cm
Size Of Lesion: 3mm

## 2023-06-13 NOTE — PROCEDURE: TREATMENT REGIMEN
Continue Regimen: Hydroquinone 4% cream apply to affected skin twice a day x2 months take 1 month. Repeat must use with sunscreen
Detail Level: Detailed

## 2024-04-02 ENCOUNTER — APPOINTMENT (RX ONLY)
Dept: URBAN - METROPOLITAN AREA CLINIC 35 | Facility: CLINIC | Age: 54
Setting detail: DERMATOLOGY
End: 2024-04-02

## 2024-04-02 DIAGNOSIS — L0391 CELLULITIS AND ABSCESS OF UNSPECIFIED SITES: ICD-10-CM

## 2024-04-02 DIAGNOSIS — Z71.89 OTHER SPECIFIED COUNSELING: ICD-10-CM

## 2024-04-02 DIAGNOSIS — L0390 CELLULITIS AND ABSCESS OF UNSPECIFIED SITES: ICD-10-CM

## 2024-04-02 PROBLEM — J34.0 ABSCESS, FURUNCLE AND CARBUNCLE OF NOSE: Status: ACTIVE | Noted: 2024-04-02

## 2024-04-02 PROCEDURE — ? ADDITIONAL NOTES

## 2024-04-02 PROCEDURE — ? COUNSELING

## 2024-04-02 PROCEDURE — ? ORDER TESTS

## 2024-04-02 PROCEDURE — 99212 OFFICE O/P EST SF 10 MIN: CPT

## 2024-04-02 ASSESSMENT — LOCATION SIMPLE DESCRIPTION DERM
LOCATION SIMPLE: NOSE
LOCATION SIMPLE: LEFT NOSE

## 2024-04-02 ASSESSMENT — LOCATION DETAILED DESCRIPTION DERM
LOCATION DETAILED: NASAL SUPRATIP
LOCATION DETAILED: LEFT NARIS

## 2024-04-02 ASSESSMENT — LOCATION ZONE DERM: LOCATION ZONE: NOSE

## 2024-04-02 NOTE — HPI: SKIN LESION
Is This A New Presentation, Or A Follow-Up?: Skin Lesions
How Severe Is Your Skin Lesion?: moderate
Has Your Skin Lesion Been Treated?: not been treated
Additional History: Used Doxy from PCP

## 2024-04-02 NOTE — PROCEDURE: ORDER TESTS
Billing Type: Third-Party Bill
Performing Laboratory: 0
Expected Date Of Service: 04/02/2024
Bill For Surgical Tray: no

## 2024-04-04 ENCOUNTER — RX ONLY (OUTPATIENT)
Age: 54
Setting detail: RX ONLY
End: 2024-04-04

## 2024-04-04 RX ORDER — SULFAMETHOXAZOLE AND TRIMETHOPRIM 800; 160 MG/1; MG/1
TABLET ORAL
Qty: 28 | Refills: 0 | Status: ERX | COMMUNITY
Start: 2024-04-04

## 2024-04-04 RX ORDER — SULFAMETHOXAZOLE AND TRIMETHOPRIM 800; 160 MG/1; MG/1
TABLET ORAL
Qty: 28 | Refills: 0 | Status: CANCELLED
Stop reason: ENTERED-IN-ERROR

## 2024-04-04 RX ORDER — CLINDAMYCIN HYDROCHLORIDE 300 MG/1
1 CAPSULE ORAL TID
Qty: 30 | Refills: 0 | Status: ERX | COMMUNITY
Start: 2024-04-04

## 2024-04-05 ENCOUNTER — HOSPITAL ENCOUNTER (OUTPATIENT)
Dept: LAB | Facility: MEDICAL CENTER | Age: 54
End: 2024-04-05
Attending: FAMILY MEDICINE
Payer: COMMERCIAL

## 2024-04-05 LAB
ALBUMIN SERPL BCP-MCNC: 4.2 G/DL (ref 3.2–4.9)
ALBUMIN/GLOB SERPL: 1.4 G/DL
ALP SERPL-CCNC: 81 U/L (ref 30–99)
ALT SERPL-CCNC: 19 U/L (ref 2–50)
ANION GAP SERPL CALC-SCNC: 10 MMOL/L (ref 7–16)
AST SERPL-CCNC: 24 U/L (ref 12–45)
BILIRUB SERPL-MCNC: 0.5 MG/DL (ref 0.1–1.5)
BUN SERPL-MCNC: 13 MG/DL (ref 8–22)
CALCIUM ALBUM COR SERPL-MCNC: 9.1 MG/DL (ref 8.5–10.5)
CALCIUM SERPL-MCNC: 9.3 MG/DL (ref 8.5–10.5)
CANCER AG125 SERPL-ACNC: 14.8 U/ML (ref 0–35)
CHLORIDE SERPL-SCNC: 104 MMOL/L (ref 96–112)
CHOLEST SERPL-MCNC: 190 MG/DL (ref 100–199)
CO2 SERPL-SCNC: 24 MMOL/L (ref 20–33)
CREAT SERPL-MCNC: 0.61 MG/DL (ref 0.5–1.4)
CREAT UR-MCNC: 80.58 MG/DL
EST. AVERAGE GLUCOSE BLD GHB EST-MCNC: 114 MG/DL
FASTING STATUS PATIENT QL REPORTED: NORMAL
GFR SERPLBLD CREATININE-BSD FMLA CKD-EPI: 107 ML/MIN/1.73 M 2
GLOBULIN SER CALC-MCNC: 3.1 G/DL (ref 1.9–3.5)
GLUCOSE SERPL-MCNC: 89 MG/DL (ref 65–99)
HBA1C MFR BLD: 5.6 % (ref 4–5.6)
HDLC SERPL-MCNC: 53 MG/DL
LDLC SERPL CALC-MCNC: 96 MG/DL
MICROALBUMIN UR-MCNC: <1.2 MG/DL
MICROALBUMIN/CREAT UR: NORMAL MG/G (ref 0–30)
POTASSIUM SERPL-SCNC: 4.5 MMOL/L (ref 3.6–5.5)
PROT SERPL-MCNC: 7.3 G/DL (ref 6–8.2)
SODIUM SERPL-SCNC: 138 MMOL/L (ref 135–145)
T4 FREE SERPL-MCNC: 1.24 NG/DL (ref 0.93–1.7)
TRIGL SERPL-MCNC: 204 MG/DL (ref 0–149)
TSH SERPL DL<=0.005 MIU/L-ACNC: 1.06 UIU/ML (ref 0.38–5.33)

## 2024-04-05 PROCEDURE — 80061 LIPID PANEL: CPT

## 2024-04-05 PROCEDURE — 80053 COMPREHEN METABOLIC PANEL: CPT

## 2024-04-05 PROCEDURE — 84443 ASSAY THYROID STIM HORMONE: CPT

## 2024-04-05 PROCEDURE — 86304 IMMUNOASSAY TUMOR CA 125: CPT

## 2024-04-05 PROCEDURE — 84439 ASSAY OF FREE THYROXINE: CPT

## 2024-04-05 PROCEDURE — 83036 HEMOGLOBIN GLYCOSYLATED A1C: CPT

## 2024-04-05 PROCEDURE — 82043 UR ALBUMIN QUANTITATIVE: CPT

## 2024-04-05 PROCEDURE — 36415 COLL VENOUS BLD VENIPUNCTURE: CPT

## 2024-04-05 PROCEDURE — 82570 ASSAY OF URINE CREATININE: CPT

## 2024-04-15 ENCOUNTER — APPOINTMENT (RX ONLY)
Dept: URBAN - METROPOLITAN AREA CLINIC 35 | Facility: CLINIC | Age: 54
Setting detail: DERMATOLOGY
End: 2024-04-15

## 2024-04-15 ENCOUNTER — RX ONLY (OUTPATIENT)
Age: 54
Setting detail: RX ONLY
End: 2024-04-15

## 2024-04-15 DIAGNOSIS — L0390 CELLULITIS AND ABSCESS OF UNSPECIFIED SITES: ICD-10-CM | Status: RESOLVING

## 2024-04-15 DIAGNOSIS — L0391 CELLULITIS AND ABSCESS OF UNSPECIFIED SITES: ICD-10-CM | Status: RESOLVING

## 2024-04-15 DIAGNOSIS — Z79.899 OTHER LONG TERM (CURRENT) DRUG THERAPY: ICD-10-CM

## 2024-04-15 PROBLEM — L08.9 LOCAL INFECTION OF THE SKIN AND SUBCUTANEOUS TISSUE, UNSPECIFIED: Status: ACTIVE | Noted: 2024-04-15

## 2024-04-15 PROCEDURE — ? TREATMENT REGIMEN

## 2024-04-15 PROCEDURE — 99213 OFFICE O/P EST LOW 20 MIN: CPT

## 2024-04-15 PROCEDURE — ? COUNSELING

## 2024-04-15 PROCEDURE — ? LAB REPORTS REVIEWED

## 2024-04-15 PROCEDURE — ? HIGH RISK MEDICATION MONITORING

## 2024-04-15 RX ORDER — CLINDAMYCIN HYDROCHLORIDE 300 MG/1
1 CAPSULE ORAL TID
Qty: 30 | Refills: 0 | Status: ERX | COMMUNITY
Start: 2024-04-15

## 2024-04-15 ASSESSMENT — LOCATION DETAILED DESCRIPTION DERM
LOCATION DETAILED: NASAL SUPRATIP
LOCATION DETAILED: LEFT NARIS

## 2024-04-15 ASSESSMENT — LOCATION SIMPLE DESCRIPTION DERM
LOCATION SIMPLE: LEFT NOSE
LOCATION SIMPLE: NOSE

## 2024-04-15 ASSESSMENT — LOCATION ZONE DERM: LOCATION ZONE: NOSE

## 2024-04-15 NOTE — PROCEDURE: HIGH RISK MEDICATION MONITORING
Cellcept Counseling:  I discussed with the patient the risks of mycophenolate mofetil including but not limited to infection/immunosuppression, GI upset, hypokalemia, hypercholesterolemia, bone marrow suppression, lymphoproliferative disorders, malignancy, GI ulceration/bleed/perforation, colitis, interstitial lung disease, kidney failure, progressive multifocal leukoencephalopathy, and birth defects.  The patient understands that monitoring is required including a baseline creatinine and regular CBC testing. In addition, patient must alert us immediately if symptoms of infection or other concerning signs are noted.
Prednisone Pregnancy And Lactation Text: This medication is Pregnancy Category C and it isn't know if it is safe during pregnancy. This medication is excreted in breast milk.
Albendazole Counseling:  I discussed with the patient the risks of albendazole including but not limited to cytopenia, kidney damage, nausea/vomiting and severe allergy.  The patient understands that this medication is being used in an off-label manner.
High Dose Vitamin A Counseling: Side effects reviewed, pt to contact office should one occur.
Solaraze Pregnancy And Lactation Text: This medication is Pregnancy Category B and is considered safe. There is some data to suggest avoiding during the third trimester. It is unknown if this medication is excreted in breast milk.
Opzelura Counseling:  I discussed with the patient the risks of Opzelura including but not limited to nasopharngitis, bronchitis, ear infection, eosinophila, hives, diarrhea, folliculitis, tonsillitis, and rhinorrhea.  Taken orally, this medication has been linked to serious infections; higher rate of mortality; malignancy and lymphoproliferative disorders; major adverse cardiovascular events; thrombosis; thrombocytopenia, anemia, and neutropenia; and lipid elevations.
Stelara Pregnancy And Lactation Text: This medication is Pregnancy Category B and is considered safe during pregnancy. It is unknown if this medication is excreted in breast milk.
SSKI Counseling:  I discussed with the patient the risks of SSKI including but not limited to thyroid abnormalities, metallic taste, GI upset, fever, headache, acne, arthralgias, paraesthesias, lymphadenopathy, easy bleeding, arrhythmias, and allergic reaction.
Minocycline Pregnancy And Lactation Text: This medication is Pregnancy Category D and not consider safe during pregnancy. It is also excreted in breast milk.
Topical Ketoconazole Pregnancy And Lactation Text: This medication is Pregnancy Category B and is considered safe during pregnancy. It is unknown if it is excreted in breast milk.
Rituxan Counseling:  I discussed with the patient the risks of Rituxan infusions. Side effects can include infusion reactions, severe drug rashes including mucocutaneous reactions, reactivation of latent hepatitis and other infections and rarely progressive multifocal leukoencephalopathy.  All of the patient's questions and concerns were addressed.
Clindamycin Counseling: I counseled the patient regarding use of clindamycin as an antibiotic for prophylactic and/or therapeutic purposes. Clindamycin is active against numerous classes of bacteria, including skin bacteria. Side effects may include nausea, diarrhea, gastrointestinal upset, rash, hives, yeast infections, and in rare cases, colitis.
Terbinafine Counseling: Patient counseling regarding adverse effects of terbinafine including but not limited to headache, diarrhea, rash, upset stomach, liver function test abnormalities, itching, taste/smell disturbance, nausea, abdominal pain, and flatulence.  There is a rare possibility of liver failure that can occur when taking terbinafine.  The patient understands that a baseline LFT and kidney function test may be required. The patient verbalized understanding of the proper use and possible adverse effects of terbinafine.  All of the patient's questions and concerns were addressed.
Eucrisa Pregnancy And Lactation Text: This medication has not been assigned a Pregnancy Risk Category but animal studies failed to show danger with the topical medication. It is unknown if the medication is excreted in breast milk.
Olanzapine Pregnancy And Lactation Text: This medication is pregnancy category C.   There are no adequate and well controlled trials with olanzapine in pregnant females.  Olanzapine should be used during pregnancy only if the potential benefit justifies the potential risk to the fetus.   In a study in lactating healthy women, olanzapine was excreted in breast milk.  It is recommended that women taking olanzapine should not breast feed.
Rinvoq Pregnancy And Lactation Text: Based on animal studies, Rinvoq may cause embryo-fetal harm when administered to pregnant women.  The medication should not be used in pregnancy.  Breastfeeding is not recommended during treatment and for 6 days after the last dose.
Erivedge Pregnancy And Lactation Text: This medication is Pregnancy Category X and is absolutely contraindicated during pregnancy. It is unknown if it is excreted in breast milk.
Sotyktu Counseling:  I discussed the most common side effects of Sotyktu including: common cold, sore throat, sinus infections, cold sores, canker sores, folliculitis, and acne.? I also discussed more serious side effects of Sotyktu including but not limited to: serious allergic reactions; increased risk for infections such as TB; cancers such as lymphomas; rhabdomyolysis and elevated CPK; and elevated triglycerides and liver enzymes.?
Cimzia Pregnancy And Lactation Text: This medication crosses the placenta but can be considered safe in certain situations. Cimzia may be excreted in breast milk.
Finasteride Female Counseling: Finasteride Counseling:  I discussed with the patient the risks of use of finasteride including but not limited to decreased libido and sexual dysfunction. Explained the teratogenic nature of the medication and stressed the importance of not getting pregnant during treatment. All of the patient's questions and concerns were addressed.
Colchicine Counseling:  Patient counseled regarding adverse effects including but not limited to stomach upset (nausea, vomiting, stomach pain, or diarrhea).  Patient instructed to limit alcohol consumption while taking this medication.  Colchicine may reduce blood counts especially with prolonged use.  The patient understands that monitoring of kidney function and blood counts may be required, especially at baseline. The patient verbalized understanding of the proper use and possible adverse effects of colchicine.  All of the patient's questions and concerns were addressed.
Opioid Counseling: I discussed with the patient the potential side effects of opioids including but not limited to addiction, altered mental status, and depression. I stressed avoiding alcohol, benzodiazepines, muscle relaxants and sleep aids unless specifically okayed by a physician. The patient verbalized understanding of the proper use and possible adverse effects of opioids. All of the patient's questions and concerns were addressed. They were instructed to flush the remaining pills down the toilet if they did not need them for pain.
Xeljanz Counseling: I discussed with the patient the risks of Xeljanz therapy including increased risk of infection, liver issues, headache, diarrhea, or cold symptoms. Live vaccines should be avoided. They were instructed to call if they have any problems.
Topical Sulfur Applications Counseling: Topical Sulfur Counseling: Patient counseled that this medication may cause skin irritation or allergic reactions.  In the event of skin irritation, the patient was advised to reduce the amount of the drug applied or use it less frequently.   The patient verbalized understanding of the proper use and possible adverse effects of topical sulfur application.  All of the patient's questions and concerns were addressed.
Quinolones Counseling:  I discussed with the patient the risks of fluoroquinolones including but not limited to GI upset, allergic reaction, drug rash, diarrhea, dizziness, photosensitivity, yeast infections, liver function test abnormalities, tendonitis/tendon rupture.
Rituxan Pregnancy And Lactation Text: This medication is Pregnancy Category C and it isn't know if it is safe during pregnancy. It is unknown if this medication is excreted in breast milk but similar antibodies are known to be excreted.
Taltz Counseling: I discussed with the patient the risks of ixekizumab including but not limited to immunosuppression, serious infections, worsening of inflammatory bowel disease and drug reactions.  The patient understands that monitoring is required including a PPD at baseline and must alert us or the primary physician if symptoms of infection or other concerning signs are noted.
Fluconazole Counseling:  Patient counseled regarding adverse effects of fluconazole including but not limited to headache, diarrhea, nausea, upset stomach, liver function test abnormalities, taste disturbance, and stomach pain.  There is a rare possibility of liver failure that can occur when taking fluconazole.  The patient understands that monitoring of LFTs and kidney function test may be required, especially at baseline. The patient verbalized understanding of the proper use and possible adverse effects of fluconazole.  All of the patient's questions and concerns were addressed.
Libtayo Counseling- I discussed with the patient the risks of Libtayo including but not limited to nausea, vomiting, diarrhea, and bone or muscle pain.  The patient verbalized understanding of the proper use and possible adverse effects of Libtayo.  All of the patient's questions and concerns were addressed.
Opzelura Pregnancy And Lactation Text: There is insufficient data to evaluate drug-associated risk for major birth defects, miscarriage, or other adverse maternal or fetal outcomes.  There is a pregnancy registry that monitors pregnancy outcomes in pregnant persons exposed to the medication during pregnancy.  It is unknown if this medication is excreted in breast milk.  Do not breastfeed during treatment and for about 4 weeks after the last dose.
Calcipotriene Pregnancy And Lactation Text: This medication has not been proven safe during pregnancy. It is unknown if this medication is excreted in breast milk.
Hydroquinone Counseling:  Patient advised that medication may result in skin irritation, lightening (hypopigmentation), dryness, and burning.  In the event of skin irritation, the patient was advised to reduce the amount of the drug applied or use it less frequently.  Rarely, spots that are treated with hydroquinone can become darker (pseudoochronosis).  Should this occur, patient instructed to stop medication and call the office. The patient verbalized understanding of the proper use and possible adverse effects of hydroquinone.  All of the patient's questions and concerns were addressed.
Cellcept Pregnancy And Lactation Text: This medication is Pregnancy Category D and isn't considered safe during pregnancy. It is unknown if this medication is excreted in breast milk.
Humira Counseling:  I discussed with the patient the risks of adalimumab including but not limited to myelosuppression, immunosuppression, autoimmune hepatitis, demyelinating diseases, lymphoma, and serious infections.  The patient understands that monitoring is required including a PPD at baseline and must alert us or the primary physician if symptoms of infection or other concerning signs are noted.
High Dose Vitamin A Pregnancy And Lactation Text: High dose vitamin A therapy is contraindicated during pregnancy and breast feeding.
Hydroxychloroquine Pregnancy And Lactation Text: This medication has been shown to cause fetal harm but it isn't assigned a Pregnancy Risk Category. There are small amounts excreted in breast milk.
Clindamycin Pregnancy And Lactation Text: This medication can be used in pregnancy if certain situations. Clindamycin is also present in breast milk.
Oral Minoxidil Counseling- I discussed with the patient the risks of oral minoxidil including but not limited to shortness of breath, swelling of the feet or ankles, dizziness, lightheadedness, unwanted hair growth and allergic reaction.  The patient verbalized understanding of the proper use and possible adverse effects of oral minoxidil.  All of the patient's questions and concerns were addressed.
Albendazole Pregnancy And Lactation Text: This medication is Pregnancy Category C and it isn't known if it is safe during pregnancy. It is also excreted in breast milk.
Terbinafine Pregnancy And Lactation Text: This medication is Pregnancy Category B and is considered safe during pregnancy. It is also excreted in breast milk and breast feeding isn't recommended.
Topical Retinoid counseling:  Patient advised to apply a pea-sized amount only at bedtime and wait 30 minutes after washing their face before applying.  If too drying, patient may add a non-comedogenic moisturizer. The patient verbalized understanding of the proper use and possible adverse effects of retinoids.  All of the patient's questions and concerns were addressed.
Sski Pregnancy And Lactation Text: This medication is Pregnancy Category D and isn't considered safe during pregnancy. It is excreted in breast milk.
Oral Minoxidil Pregnancy And Lactation Text: This medication should only be used when clearly needed if you are pregnant, attempting to become pregnant or breast feeding.
Sotyktu Pregnancy And Lactation Text: There is insufficient data to evaluate whether or not Sotyktu is safe to use during pregnancy.? ?It is not known if Sotyktu passes into breast milk and whether or not it is safe to use when breastfeeding.??
5-Fu Counseling: 5-Fluorouracil Counseling:  I discussed with the patient the risks of 5-fluorouracil including but not limited to erythema, scaling, itching, weeping, crusting, and pain.
Cosentyx Counseling:  I discussed with the patient the risks of Cosentyx including but not limited to worsening of Crohn's disease, immunosuppression, allergic reactions and infections.  The patient understands that monitoring is required including a PPD at baseline and must alert us or the primary physician if symptoms of infection or other concerning signs are noted.
Colchicine Pregnancy And Lactation Text: This medication is Pregnancy Category C and isn't considered safe during pregnancy. It is excreted in breast milk.
Opioid Pregnancy And Lactation Text: These medications can lead to premature delivery and should be avoided during pregnancy. These medications are also present in breast milk in small amounts.
Libtayo Pregnancy And Lactation Text: This medication is contraindicated in pregnancy and when breast feeding.
Ivermectin Counseling:  Patient instructed to take medication on an empty stomach with a full glass of water.  Patient informed of potential adverse effects including but not limited to nausea, diarrhea, dizziness, itching, and swelling of the extremities or lymph nodes.  The patient verbalized understanding of the proper use and possible adverse effects of ivermectin.  All of the patient's questions and concerns were addressed.
Picato Counseling:  I discussed with the patient the risks of Picato including but not limited to erythema, scaling, itching, weeping, crusting, and pain.
Cibinqo Counseling: I discussed with the patient the risks of Cibinqo therapy including but not limited to common cold, nausea, headache, cold sores, increased blood CPK levels, dizziness, UTIs, fatigue, acne, and vomitting. Live vaccines should be avoided.  This medication has been linked to serious infections; higher rate of mortality; malignancy and lymphoproliferative disorders; major adverse cardiovascular events; thrombosis; thrombocytopenia and lymphopenia; lipid elevations; and retinal detachment.
Low Dose Naltrexone Counseling- I discussed with the patient the potential risks and side effects of low dose naltrexone including but not limited to: more vivid dreams, headaches, nausea, vomiting, abdominal pain, fatigue, dizziness, and anxiety.
Fluconazole Pregnancy And Lactation Text: This medication is Pregnancy Category C and it isn't know if it is safe during pregnancy. It is also excreted in breast milk.
Xelerikz Pregnancy And Lactation Text: This medication is Pregnancy Category D and is not considered safe during pregnancy.  The risk during breast feeding is also uncertain.
Finasteride Pregnancy And Lactation Text: This medication is absolutely contraindicated during pregnancy. It is unknown if it is excreted in breast milk.
Cimetidine Counseling:  I discussed with the patient the risks of Cimetidine including but not limited to gynecomastia, headache, diarrhea, nausea, drowsiness, arrhythmias, pancreatitis, skin rashes, psychosis, bone marrow suppression and kidney toxicity.
Birth Control Pills Counseling: Birth Control Pill Counseling: I discussed with the patient the potential side effects of OCPs including but not limited to increased risk of stroke, heart attack, thrombophlebitis, deep venous thrombosis, hepatic adenomas, breast changes, GI upset, headaches, and depression.  The patient verbalized understanding of the proper use and possible adverse effects of OCPs. All of the patient's questions and concerns were addressed.
Siliq Counseling:  I discussed with the patient the risks of Siliq including but not limited to new or worsening depression, suicidal thoughts and behavior, immunosuppression, malignancy, posterior leukoencephalopathy syndrome, and serious infections.  The patient understands that monitoring is required including a PPD at baseline and must alert us or the primary physician if symptoms of infection or other concerning signs are noted. There is also a special program designed to monitor depression which is required with Siliq.
Doxycycline Counseling:  Patient counseled regarding possible photosensitivity and increased risk for sunburn.  Patient instructed to avoid sunlight, if possible.  When exposed to sunlight, patients should wear protective clothing, sunglasses, and sunscreen.  The patient was instructed to call the office immediately if the following severe adverse effects occur:  hearing changes, easy bruising/bleeding, severe headache, or vision changes.  The patient verbalized understanding of the proper use and possible adverse effects of doxycycline.  All of the patient's questions and concerns were addressed.
Taltz Pregnancy And Lactation Text: The risk during pregnancy and breastfeeding is uncertain with this medication.
Cyclophosphamide Counseling:  I discussed with the patient the risks of cyclophosphamide including but not limited to hair loss, hormonal abnormalities, decreased fertility, abdominal pain, diarrhea, nausea and vomiting, bone marrow suppression and infection. The patient understands that monitoring is required while taking this medication.
Topical Sulfur Applications Pregnancy And Lactation Text: This medication is Pregnancy Category C and has an unknown safety profile during pregnancy. It is unknown if this topical medication is excreted in breast milk.
Thalidomide Counseling: I discussed with the patient the risks of thalidomide including but not limited to birth defects, anxiety, weakness, chest pain, dizziness, cough and severe allergy.
Topical Retinoid Pregnancy And Lactation Text: This medication is Pregnancy Category C. It is unknown if this medication is excreted in breast milk.
Azithromycin Counseling:  I discussed with the patient the risks of azithromycin including but not limited to GI upset, allergic reaction, drug rash, diarrhea, and yeast infections.
Otezla Counseling: The side effects of Otezla were discussed with the patient, including but not limited to worsening or new depression, weight loss, diarrhea, nausea, upper respiratory tract infection, and headache. Patient instructed to call the office should any adverse effect occur.  The patient verbalized understanding of the proper use and possible adverse effects of Otezla.  All the patient's questions and concerns were addressed.
Tazorac Counseling:  Patient advised that medication is irritating and drying.  Patient may need to apply sparingly and wash off after an hour before eventually leaving it on overnight.  The patient verbalized understanding of the proper use and possible adverse effects of tazorac.  All of the patient's questions and concerns were addressed.
Odomzo Counseling- I discussed with the patient the risks of Odomzo including but not limited to nausea, vomiting, diarrhea, constipation, weight loss, changes in the sense of taste, decreased appetite, muscle spasms, and hair loss.  The patient verbalized understanding of the proper use and possible adverse effects of Odomzo.  All of the patient's questions and concerns were addressed.
Imiquimod Counseling:  I discussed with the patient the risks of imiquimod including but not limited to erythema, scaling, itching, weeping, crusting, and pain.  Patient understands that the inflammatory response to imiquimod is variable from person to person and was educated regarded proper titration schedule.  If flu-like symptoms develop, patient knows to discontinue the medication and contact us.
Low Dose Naltrexone Pregnancy And Lactation Text: Naltrexone is pregnancy category C.  There have been no adequate and well-controlled studies in pregnant women.  It should be used in pregnancy only if the potential benefit justifies the potential risk to the fetus.   Limited data indicates that naltrexone is minimally excreted into breastmilk.
Cibinqo Pregnancy And Lactation Text: It is unknown if this medication will adversely affect pregnancy or breast feeding.  You should not take this medication if you are currently pregnant or planning a pregnancy or while breastfeeding.
Griseofulvin Counseling:  I discussed with the patient the risks of griseofulvin including but not limited to photosensitivity, cytopenia, liver damage, nausea/vomiting and severe allergy.  The patient understands that this medication is best absorbed when taken with a fatty meal (e.g., ice cream or french fries).
Azelaic Acid Counseling: Patient counseled that medicine may cause skin irritation and to avoid applying near the eyes.  In the event of skin irritation, the patient was advised to reduce the amount of the drug applied or use it less frequently.   The patient verbalized understanding of the proper use and possible adverse effects of azelaic acid.  All of the patient's questions and concerns were addressed.
Dapsone Counseling: I discussed with the patient the risks of dapsone including but not limited to hemolytic anemia, agranulocytosis, rashes, methemoglobinemia, kidney failure, peripheral neuropathy, headaches, GI upset, and liver toxicity.  Patients who start dapsone require monitoring including baseline LFTs and weekly CBCs for the first month, then every month thereafter.  The patient verbalized understanding of the proper use and possible adverse effects of dapsone.  All of the patient's questions and concerns were addressed.
5-Fu Pregnancy And Lactation Text: This medication is Pregnancy Category X and contraindicated in pregnancy and in women who may become pregnant. It is unknown if this medication is excreted in breast milk.
Birth Control Pills Pregnancy And Lactation Text: This medication should be avoided if pregnant and for the first 30 days post-partum.
Azelaic Acid Pregnancy And Lactation Text: This medication is considered safe during pregnancy and breast feeding.
Doxycycline Pregnancy And Lactation Text: This medication is Pregnancy Category D and not consider safe during pregnancy. It is also excreted in breast milk but is considered safe for shorter treatment courses.
Hyrimoz Counseling:  I discussed with the patient the risks of adalimumab including but not limited to myelosuppression, immunosuppression, autoimmune hepatitis, demyelinating diseases, lymphoma, and serious infections.  The patient understands that monitoring is required including a PPD at baseline and must alert us or the primary physician if symptoms of infection or other concerning signs are noted.
Rifampin Counseling: I discussed with the patient the risks of rifampin including but not limited to liver damage, kidney damage, red-orange body fluids, nausea/vomiting and severe allergy.
Tremfya Counseling: I discussed with the patient the risks of guselkumab including but not limited to immunosuppression, serious infections, worsening of inflammatory bowel disease and drug reactions.  The patient understands that monitoring is required including a PPD at baseline and must alert us or the primary physician if symptoms of infection or other concerning signs are noted.
Acitretin Counseling:  I discussed with the patient the risks of acitretin including but not limited to hair loss, dry lips/skin/eyes, liver damage, hyperlipidemia, depression/suicidal ideation, photosensitivity.  Serious rare side effects can include but are not limited to pancreatitis, pseudotumor cerebri, bony changes, clot formation/stroke/heart attack.  Patient understands that alcohol is contraindicated since it can result in liver toxicity and significantly prolong the elimination of the drug by many years.
Cyclophosphamide Pregnancy And Lactation Text: This medication is Pregnancy Category D and it isn't considered safe during pregnancy. This medication is excreted in breast milk.
Wartpeel Counseling:  I discussed with the patient the risks of Wartpeel including but not limited to erythema, scaling, itching, weeping, crusting, and pain.
Cyclosporine Counseling:  I discussed with the patient the risks of cyclosporine including but not limited to hypertension, gingival hyperplasia,myelosuppression, immunosuppression, liver damage, kidney damage, neurotoxicity, lymphoma, and serious infections. The patient understands that monitoring is required including baseline blood pressure, CBC, CMP, lipid panel and uric acid, and then 1-2 times monthly CMP and blood pressure.
Acitretin Pregnancy And Lactation Text: This medication is Pregnancy Category X and should not be given to women who are pregnant or may become pregnant in the future. This medication is excreted in breast milk.
Azithromycin Pregnancy And Lactation Text: This medication is considered safe during pregnancy and is also secreted in breast milk.
Griseofulvin Pregnancy And Lactation Text: This medication is Pregnancy Category X and is known to cause serious birth defects. It is unknown if this medication is excreted in breast milk but breast feeding should be avoided.
Tazorac Pregnancy And Lactation Text: This medication is not safe during pregnancy. It is unknown if this medication is excreted in breast milk.
Simponi Counseling:  I discussed with the patient the risks of golimumab including but not limited to myelosuppression, immunosuppression, autoimmune hepatitis, demyelinating diseases, lymphoma, and serious infections.  The patient understands that monitoring is required including a PPD at baseline and must alert us or the primary physician if symptoms of infection or other concerning signs are noted.
Protopic Counseling: Patient may experience a mild burning sensation during topical application. Protopic is not approved in children less than 2 years of age. There have been case reports of hematologic and skin malignancies in patients using topical calcineurin inhibitors although causality is questionable.
Tranexamic Acid Counseling:  Patient advised of the small risk of bleeding problems with tranexamic acid. They were also instructed to call if they developed any nausea, vomiting or diarrhea. All of the patient's questions and concerns were addressed.
Erythromycin Counseling:  I discussed with the patient the risks of erythromycin including but not limited to GI upset, allergic reaction, drug rash, diarrhea, increase in liver enzymes, and yeast infections.
Niacinamide Counseling: I recommended taking niacin or niacinamide, also know as vitamin B3, twice daily. Recent evidence suggests that taking vitamin B3 (500 mg twice daily) can reduce the risk of actinic keratoses and non-melanoma skin cancers. Side effects of vitamin B3 include flushing and headache.
Otezla Pregnancy And Lactation Text: This medication is Pregnancy Category C and it isn't known if it is safe during pregnancy. It is unknown if it is excreted in breast milk.
Drysol Counseling:  I discussed with the patient the risks of drysol/aluminum chloride including but not limited to skin rash, itching, irritation, burning.
Litfulo Counseling: Litfulo (Ritlecitinib) Counseling: I discussed with the patient the risks of Litfulo therapy including but not limited to headache, upper respiratory infections, nausea, diarrhea, acne, and urticaria. Live vaccines should be avoided.  This medication has been linked to serious infections; higher rate of mortality; malignancy and lymphoproliferative disorders; major adverse cardiovascular events; thrombosis; decreases in lymphocytes and platelets; liver enzyme elevations; and CPK elevations.
Dupixent Counseling: I discussed with the patient the risks of dupilumab including but not limited to eye infection and irritation, cold sores, injection site reactions, worsening of asthma, allergic reactions and increased risk of parasitic infection.  Live vaccines should be avoided while taking dupilumab. Dupilumab will also interact with certain medications such as warfarin and cyclosporine. The patient understands that monitoring is required and they must alert us or the primary physician if symptoms of infection or other concerning signs are noted.
Dapsone Pregnancy And Lactation Text: This medication is Pregnancy Category C and is not considered safe during pregnancy or breast feeding.
Gabapentin Counseling: I discussed with the patient the risks of gabapentin including but not limited to dizziness, somnolence, fatigue and ataxia.
Dupixent Pregnancy And Lactation Text: This medication likely crosses the placenta but the risk for the fetus is uncertain. This medication is excreted in breast milk.
Spironolactone Counseling: Patient advised regarding risks of diarrhea, abdominal pain, hyperkalemia, birth defects (for female patients), liver toxicity and renal toxicity. The patient may need blood work to monitor liver and kidney function and potassium levels while on therapy. The patient verbalized understanding of the proper use and possible adverse effects of spironolactone.  All of the patient's questions and concerns were addressed.
Adbry Counseling: I discussed with the patient the risks of tralokinumab including but not limited to eye infection and irritation, cold sores, injection site reactions, worsening of asthma, allergic reactions and increased risk of parasitic infection.  Live vaccines should be avoided while taking tralokinumab. The patient understands that monitoring is required and they must alert us or the primary physician if symptoms of infection or other concerning signs are noted.
Benzoyl Peroxide Counseling: Patient counseled that medicine may cause skin irritation and bleach clothing.  In the event of skin irritation, the patient was advised to reduce the amount of the drug applied or use it less frequently.   The patient verbalized understanding of the proper use and possible adverse effects of benzoyl peroxide.  All of the patient's questions and concerns were addressed.
Doxepin Counseling:  Patient advised that the medication is sedating and not to drive a car after taking this medication. Patient informed of potential adverse effects including but not limited to dry mouth, urinary retention, and blurry vision.  The patient verbalized understanding of the proper use and possible adverse effects of doxepin.  All of the patient's questions and concerns were addressed.
Rifampin Pregnancy And Lactation Text: This medication is Pregnancy Category C and it isn't know if it is safe during pregnancy. It is also excreted in breast milk and should not be used if you are breast feeding.
Bexarotene Counseling:  I discussed with the patient the risks of bexarotene including but not limited to hair loss, dry lips/skin/eyes, liver abnormalities, hyperlipidemia, pancreatitis, depression/suicidal ideation, photosensitivity, drug rash/allergic reactions, hypothyroidism, anemia, leukopenia, infection, cataracts, and teratogenicity.  Patient understands that they will need regular blood tests to check lipid profile, liver function tests, white blood cell count, thyroid function tests and pregnancy test if applicable.
Winlevi Counseling:  I discussed with the patient the risks of topical clascoterone including but not limited to erythema, scaling, itching, and stinging. Patient voiced their understanding.
Tranexamic Acid Pregnancy And Lactation Text: It is unknown if this medication is safe during pregnancy or breast feeding.
Ilumya Counseling: I discussed with the patient the risks of tildrakizumab including but not limited to immunosuppression, malignancy, posterior leukoencephalopathy syndrome, and serious infections.  The patient understands that monitoring is required including a PPD at baseline and must alert us or the primary physician if symptoms of infection or other concerning signs are noted.
Sarecycline Counseling: Patient advised regarding possible photosensitivity and discoloration of the teeth, skin, lips, tongue and gums.  Patient instructed to avoid sunlight, if possible.  When exposed to sunlight, patients should wear protective clothing, sunglasses, and sunscreen.  The patient was instructed to call the office immediately if the following severe adverse effects occur:  hearing changes, easy bruising/bleeding, severe headache, or vision changes.  The patient verbalized understanding of the proper use and possible adverse effects of sarecycline.  All of the patient's questions and concerns were addressed.
Erythromycin Pregnancy And Lactation Text: This medication is Pregnancy Category B and is considered safe during pregnancy. It is also excreted in breast milk.
Protopic Pregnancy And Lactation Text: This medication is Pregnancy Category C. It is unknown if this medication is excreted in breast milk when applied topically.
Itraconazole Counseling:  I discussed with the patient the risks of itraconazole including but not limited to liver damage, nausea/vomiting, neuropathy, and severe allergy.  The patient understands that this medication is best absorbed when taken with acidic beverages such as non-diet cola or ginger ale.  The patient understands that monitoring is required including baseline LFTs and repeat LFTs at intervals.  The patient understands that they are to contact us or the primary physician if concerning signs are noted.
Minoxidil Counseling: Minoxidil is a topical medication which can increase blood flow where it is applied. It is uncertain how this medication increases hair growth. Side effects are uncommon and include stinging and allergic reactions.
Bactrim Counseling:  I discussed with the patient the risks of sulfa antibiotics including but not limited to GI upset, allergic reaction, drug rash, diarrhea, dizziness, photosensitivity, and yeast infections.  Rarely, more serious reactions can occur including but not limited to aplastic anemia, agranulocytosis, methemoglobinemia, blood dyscrasias, liver or kidney failure, lung infiltrates or desquamative/blistering drug rashes.
Litfulo Pregnancy And Lactation Text: There is insufficient data to evaluate whether or not Litfulo is safe to use during pregnancy.  Breastfeeding is not recommended during treatment.
Niacinamide Pregnancy And Lactation Text: These medications are considered safe during pregnancy.
Oxybutynin Counseling:  I discussed with the patient the risks of oxybutynin including but not limited to skin rash, drowsiness, dry mouth, difficulty urinating, and blurred vision.
Olumiant Counseling: I discussed with the patient the risks of Olumiant therapy including but not limited to upper respiratory tract infections, shingles, cold sores, and nausea. Live vaccines should be avoided.  This medication has been linked to serious infections; higher rate of mortality; malignancy and lymphoproliferative disorders; major adverse cardiovascular events; thrombosis; gastrointestinal perforations; neutropenia; lymphopenia; anemia; liver enzyme elevations; and lipid elevations.
Enbrel Counseling:  I discussed with the patient the risks of etanercept including but not limited to myelosuppression, immunosuppression, autoimmune hepatitis, demyelinating diseases, lymphoma, and infections.  The patient understands that monitoring is required including a PPD at baseline and must alert us or the primary physician if symptoms of infection or other concerning signs are noted.
Elidel Counseling: Patient may experience a mild burning sensation during topical application. Elidel is not approved in children less than 2 years of age. There have been case reports of hematologic and skin malignancies in patients using topical calcineurin inhibitors although causality is questionable.
Adbry Pregnancy And Lactation Text: It is unknown if this medication will adversely affect pregnancy or breast feeding.
Arava Counseling:  Patient counseled regarding adverse effects of Arava including but not limited to nausea, vomiting, abnormalities in liver function tests. Patients may develop mouth sores, rash, diarrhea, and abnormalities in blood counts. The patient understands that monitoring is required including LFTs and blood counts.  There is a rare possibility of scarring of the liver and lung problems that can occur when taking methotrexate. Persistent nausea, loss of appetite, pale stools, dark urine, cough, and shortness of breath should be reported immediately. Patient advised to discontinue Arava treatment and consult with a physician prior to attempting conception. The patient will have to undergo a treatment to eliminate Arava from the body prior to conception.
Benzoyl Peroxide Pregnancy And Lactation Text: This medication is Pregnancy Category C. It is unknown if benzoyl peroxide is excreted in breast milk.
Doxepin Pregnancy And Lactation Text: This medication is Pregnancy Category C and it isn't known if it is safe during pregnancy. It is also excreted in breast milk and breast feeding isn't recommended.
Xolair Counseling:  Patient informed of potential adverse effects including but not limited to fever, muscle aches, rash and allergic reactions.  The patient verbalized understanding of the proper use and possible adverse effects of Xolair.  All of the patient's questions and concerns were addressed.
Dutasteride Male Counseling: Dustasteride Counseling:  I discussed with the patient the risks of use of dutasteride including but not limited to decreased libido, decreased ejaculate volume, and gynecomastia. Women who can become pregnant should not handle medication.  All of the patient's questions and concerns were addressed.
Spironolactone Pregnancy And Lactation Text: This medication can cause feminization of the male fetus and should be avoided during pregnancy. The active metabolite is also found in breast milk.
Winlevi Pregnancy And Lactation Text: This medication is considered safe during pregnancy and breastfeeding.
Topical Clindamycin Counseling: Patient counseled that this medication may cause skin irritation or allergic reactions.  In the event of skin irritation, the patient was advised to reduce the amount of the drug applied or use it less frequently.   The patient verbalized understanding of the proper use and possible adverse effects of clindamycin.  All of the patient's questions and concerns were addressed.
Metronidazole Counseling:  I discussed with the patient the risks of metronidazole including but not limited to seizures, nausea/vomiting, a metallic taste in the mouth, nausea/vomiting and severe allergy.
Methotrexate Counseling:  Patient counseled regarding adverse effects of methotrexate including but not limited to nausea, vomiting, abnormalities in liver function tests. Patients may develop mouth sores, rash, diarrhea, and abnormalities in blood counts. The patient understands that monitoring is required including LFT's and blood counts.  There is a rare possibility of scarring of the liver and lung problems that can occur when taking methotrexate. Persistent nausea, loss of appetite, pale stools, dark urine, cough, and shortness of breath should be reported immediately. Patient advised to discontinue methotrexate treatment at least three months before attempting to become pregnant.  I discussed the need for folate supplements while taking methotrexate.  These supplements can decrease side effects during methotrexate treatment. The patient verbalized understanding of the proper use and possible adverse effects of methotrexate.  All of the patient's questions and concerns were addressed.
Skyrizi Counseling: I discussed with the patient the risks of risankizumab-rzaa including but not limited to immunosuppression, and serious infections.  The patient understands that monitoring is required including a PPD at baseline and must alert us or the primary physician if symptoms of infection or other concerning signs are noted.
Xolair Pregnancy And Lactation Text: This medication is Pregnancy Category B and is considered safe during pregnancy. This medication is excreted in breast milk.
Valtrex Counseling: I discussed with the patient the risks of valacyclovir including but not limited to kidney damage, nausea, vomiting and severe allergy.  The patient understands that if the infection seems to be worsening or is not improving, they are to call.
Rhofade Counseling: Rhofade is a topical medication which can decrease superficial blood flow where applied. Side effects are uncommon and include stinging, redness and allergic reactions.
Bactrim Pregnancy And Lactation Text: This medication is Pregnancy Category D and is known to cause fetal risk.  It is also excreted in breast milk.
Nsaids Counseling: NSAID Counseling: I discussed with the patient that NSAIDs should be taken with food. Prolonged use of NSAIDs can result in the development of stomach ulcers.  Patient advised to stop taking NSAIDs if abdominal pain occurs.  The patient verbalized understanding of the proper use and possible adverse effects of NSAIDs.  All of the patient's questions and concerns were addressed.
Bexarotene Pregnancy And Lactation Text: This medication is Pregnancy Category X and should not be given to women who are pregnant or may become pregnant. This medication should not be used if you are breast feeding.
Carac Counseling:  I discussed with the patient the risks of Carac including but not limited to erythema, scaling, itching, weeping, crusting, and pain.
Nsaids Pregnancy And Lactation Text: These medications are considered safe up to 30 weeks gestation. It is excreted in breast milk.
Propranolol Counseling:  I discussed with the patient the risks of propranolol including but not limited to low heart rate, low blood pressure, low blood sugar, restlessness and increased cold sensitivity. They should call the office if they experience any of these side effects.
Valtrex Pregnancy And Lactation Text: this medication is Pregnancy Category B and is considered safe during pregnancy. This medication is not directly found in breast milk but it's metabolite acyclovir is present.
Bimzelx Counseling:  I discussed with the patient the risks of Bimzelx including but not limited to depression, immunosuppression, allergic reactions and infections.  The patient understands that monitoring is required including a PPD at baseline and must alert us or the primary physician if symptoms of infection or other concerning signs are noted.
Ketoconazole Counseling:   Patient counseled regarding improving absorption with orange juice.  Adverse effects include but are not limited to breast enlargement, headache, diarrhea, nausea, upset stomach, liver function test abnormalities, taste disturbance, and stomach pain.  There is a rare possibility of liver failure that can occur when taking ketoconazole. The patient understands that monitoring of LFTs may be required, especially at baseline. The patient verbalized understanding of the proper use and possible adverse effects of ketoconazole.  All of the patient's questions and concerns were addressed.
Rhofade Pregnancy And Lactation Text: This medication has not been assigned a Pregnancy Risk Category. It is unknown if the medication is excreted in breast milk.
Mirvaso Counseling: Mirvaso is a topical medication which can decrease superficial blood flow where applied. Side effects are uncommon and include stinging, redness and allergic reactions.
Glycopyrrolate Counseling:  I discussed with the patient the risks of glycopyrrolate including but not limited to skin rash, drowsiness, dry mouth, difficulty urinating, and blurred vision.
Hydroxyzine Counseling: Patient advised that the medication is sedating and not to drive a car after taking this medication.  Patient informed of potential adverse effects including but not limited to dry mouth, urinary retention, and blurry vision.  The patient verbalized understanding of the proper use and possible adverse effects of hydroxyzine.  All of the patient's questions and concerns were addressed.
Olumiant Pregnancy And Lactation Text: Based on animal studies, Olumiant may cause embryo-fetal harm when administered to pregnant women.  The medication should not be used in pregnancy.  Breastfeeding is not recommended during treatment.
Dutasteride Female Counseling: Dutasteride Counseling:  I discussed with the patient the risks of use of dutasteride including but not limited to decreased libido and sexual dysfunction. Explained the teratogenic nature of the medication and stressed the importance of not getting pregnant during treatment. All of the patient's questions and concerns were addressed.
Tetracycline Counseling: Patient counseled regarding possible photosensitivity and increased risk for sunburn.  Patient instructed to avoid sunlight, if possible.  When exposed to sunlight, patients should wear protective clothing, sunglasses, and sunscreen.  The patient was instructed to call the office immediately if the following severe adverse effects occur:  hearing changes, easy bruising/bleeding, severe headache, or vision changes.  The patient verbalized understanding of the proper use and possible adverse effects of tetracycline.  All of the patient's questions and concerns were addressed. Patient understands to avoid pregnancy while on therapy due to potential birth defects.
Include Pregnancy/Lactation Warning?: No
Dutasteride Pregnancy And Lactation Text: This medication is absolutely contraindicated in women, especially during pregnancy and breast feeding. Feminization of male fetuses is possible if taking while pregnant.
Hydroxyzine Pregnancy And Lactation Text: This medication is not safe during pregnancy and should not be taken. It is also excreted in breast milk and breast feeding isn't recommended.
Metronidazole Pregnancy And Lactation Text: This medication is Pregnancy Category B and considered safe during pregnancy.  It is also excreted in breast milk.
Infliximab Counseling:  I discussed with the patient the risks of infliximab including but not limited to myelosuppression, immunosuppression, autoimmune hepatitis, demyelinating diseases, lymphoma, and serious infections.  The patient understands that monitoring is required including a PPD at baseline and must alert us or the primary physician if symptoms of infection or other concerning signs are noted.
Isotretinoin Counseling: Patient should get monthly blood tests, not donate blood, not drive at night if vision affected, not share medication, and not undergo elective surgery for 6 months after tx completed. Side effects reviewed, pt to contact office should one occur.
Cephalexin Counseling: I counseled the patient regarding use of cephalexin as an antibiotic for prophylactic and/or therapeutic purposes. Cephalexin (commonly prescribed under brand name Keflex) is a cephalosporin antibiotic which is active against numerous classes of bacteria, including most skin bacteria. Side effects may include nausea, diarrhea, gastrointestinal upset, rash, hives, yeast infections, and in rare cases, hepatitis, kidney disease, seizures, fever, confusion, neurologic symptoms, and others. Patients with severe allergies to penicillin medications are cautioned that there is about a 10% incidence of cross-reactivity with cephalosporins. When possible, patients with penicillin allergies should use alternatives to cephalosporins for antibiotic therapy.
Azathioprine Counseling:  I discussed with the patient the risks of azathioprine including but not limited to myelosuppression, immunosuppression, hepatotoxicity, lymphoma, and infections.  The patient understands that monitoring is required including baseline LFTs, Creatinine, possible TPMP genotyping and weekly CBCs for the first month and then every 2 weeks thereafter.  The patient verbalized understanding of the proper use and possible adverse effects of azathioprine.  All of the patient's questions and concerns were addressed.
Zyclara Counseling:  I discussed with the patient the risks of imiquimod including but not limited to erythema, scaling, itching, weeping, crusting, and pain.  Patient understands that the inflammatory response to imiquimod is variable from person to person and was educated regarded proper titration schedule.  If flu-like symptoms develop, patient knows to discontinue the medication and contact us.
Methotrexate Pregnancy And Lactation Text: This medication is Pregnancy Category X and is known to cause fetal harm. This medication is excreted in breast milk.
Detail Level: Zone
Propranolol Pregnancy And Lactation Text: This medication is Pregnancy Category C and it isn't known if it is safe during pregnancy. It is excreted in breast milk.
Solaraze Counseling:  I discussed with the patient the risks of Solaraze including but not limited to erythema, scaling, itching, weeping, crusting, and pain.
Ketoconazole Pregnancy And Lactation Text: This medication is Pregnancy Category C and it isn't know if it is safe during pregnancy. It is also excreted in breast milk and breast feeding isn't recommended.
Erivedge Counseling- I discussed with the patient the risks of Erivedge including but not limited to nausea, vomiting, diarrhea, constipation, weight loss, changes in the sense of taste, decreased appetite, muscle spasms, and hair loss.  The patient verbalized understanding of the proper use and possible adverse effects of Erivedge.  All of the patient's questions and concerns were addressed.
Topical Ketoconazole Counseling: Patient counseled that this medication may cause skin irritation or allergic reactions.  In the event of skin irritation, the patient was advised to reduce the amount of the drug applied or use it less frequently.   The patient verbalized understanding of the proper use and possible adverse effects of ketoconazole.  All of the patient's questions and concerns were addressed.
Glycopyrrolate Pregnancy And Lactation Text: This medication is Pregnancy Category B and is considered safe during pregnancy. It is unknown if it is excreted breast milk.
Rinvoq Counseling: I discussed with the patient the risks of Rinvoq therapy including but not limited to upper respiratory tract infections, shingles, cold sores, bronchitis, nausea, cough, fever, acne, and headache. Live vaccines should be avoided.  This medication has been linked to serious infections; higher rate of mortality; malignancy and lymphoproliferative disorders; major adverse cardiovascular events; thrombosis; thrombocytopenia, anemia, and neutropenia; lipid elevations; liver enzyme elevations; and gastrointestinal perforations.
Olanzapine Counseling- I discussed with the patient the common side effects of olanzapine including but are not limited to: lack of energy, dry mouth, increased appetite, sleepiness, tremor, constipation, dizziness, changes in behavior, or restlessness.  Explained that teenagers are more likely to experience headaches, abdominal pain, pain in the arms or legs, tiredness, and sleepiness.  Serious side effects include but are not limited: increased risk of death in elderly patients who are confused, have memory loss, or dementia-related psychosis; hyperglycemia; increased cholesterol and triglycerides; and weight gain.
Eucrisa Counseling: Patient may experience a mild burning sensation during topical application. Eucrisa is not approved in children less than 2 years of age.
Bimzelx Pregnancy And Lactation Text: This medication crosses the placenta and the safety is uncertain during pregnancy. It is unknown if this medication is present in breast milk.
Clofazimine Counseling:  I discussed with the patient the risks of clofazimine including but not limited to skin and eye pigmentation, liver damage, nausea/vomiting, gastrointestinal bleeding and allergy.
Hydroxychloroquine Counseling:  I discussed with the patient that a baseline ophthalmologic exam is needed at the start of therapy and every year thereafter while on therapy. A CBC may also be warranted for monitoring.  The side effects of this medication were discussed with the patient, including but not limited to agranulocytosis, aplastic anemia, seizures, rashes, retinopathy, and liver toxicity. Patient instructed to call the office should any adverse effect occur.  The patient verbalized understanding of the proper use and possible adverse effects of Plaquenil.  All the patient's questions and concerns were addressed.
Calcipotriene Counseling:  I discussed with the patient the risks of calcipotriene including but not limited to erythema, scaling, itching, and irritation.
Cimzia Counseling:  I discussed with the patient the risks of Cimzia including but not limited to immunosuppression, allergic reactions and infections.  The patient understands that monitoring is required including a PPD at baseline and must alert us or the primary physician if symptoms of infection or other concerning signs are noted.
Cephalexin Pregnancy And Lactation Text: This medication is Pregnancy Category B and considered safe during pregnancy.  It is also excreted in breast milk but can be used safely for shorter doses.
Minocycline Counseling: Patient advised regarding possible photosensitivity and discoloration of the teeth, skin, lips, tongue and gums.  Patient instructed to avoid sunlight, if possible.  When exposed to sunlight, patients should wear protective clothing, sunglasses, and sunscreen.  The patient was instructed to call the office immediately if the following severe adverse effects occur:  hearing changes, easy bruising/bleeding, severe headache, or vision changes.  The patient verbalized understanding of the proper use and possible adverse effects of minocycline.  All of the patient's questions and concerns were addressed.
Stelara Counseling:  I discussed with the patient the risks of ustekinumab including but not limited to immunosuppression, malignancy, posterior leukoencephalopathy syndrome, and serious infections.  The patient understands that monitoring is required including a PPD at baseline and must alert us or the primary physician if symptoms of infection or other concerning signs are noted.
Finasteride Male Counseling: Finasteride Counseling:  I discussed with the patient the risks of use of finasteride including but not limited to decreased libido, decreased ejaculate volume, gynecomastia, and depression. Women should not handle medication.  All of the patient's questions and concerns were addressed.
Isotretinoin Pregnancy And Lactation Text: This medication is Pregnancy Category X and is considered extremely dangerous during pregnancy. It is unknown if it is excreted in breast milk.
Prednisone Counseling:  I discussed with the patient the risks of prolonged use of prednisone including but not limited to weight gain, insomnia, osteoporosis, mood changes, diabetes, susceptibility to infection, glaucoma and high blood pressure.  In cases where prednisone use is prolonged, patients should be monitored with blood pressure checks, serum glucose levels and an eye exam.  Additionally, the patient may need to be placed on GI prophylaxis, PCP prophylaxis, and calcium and vitamin D supplementation and/or a bisphosphonate.  The patient verbalized understanding of the proper use and the possible adverse effects of prednisone.  All of the patient's questions and concerns were addressed.

## 2024-04-15 NOTE — PROCEDURE: TREATMENT REGIMEN
Plan: 4/15/24 sent 4 more days of clindamycin for pt to have a full 2 week course
Samples Given: CLN samples given
Detail Level: Zone
Continue Regimen: - clindamycin 300mg TID for 10 days. 4/15/24 pt has 1 day left and is improved but it is not resolved.

## 2024-04-15 NOTE — PROCEDURE: LAB REPORTS REVIEWED
Summarized Lab Results: Vikor swab went over with patient in detail
Detail Level: Zone
Undermining Type: Entire Wound

## 2024-06-11 ENCOUNTER — APPOINTMENT (RX ONLY)
Dept: URBAN - METROPOLITAN AREA CLINIC 35 | Facility: CLINIC | Age: 54
Setting detail: DERMATOLOGY
End: 2024-06-11

## 2024-06-11 DIAGNOSIS — L81.4 OTHER MELANIN HYPERPIGMENTATION: ICD-10-CM

## 2024-06-11 DIAGNOSIS — L57.3 POIKILODERMA OF CIVATTE: ICD-10-CM

## 2024-06-11 DIAGNOSIS — D22 MELANOCYTIC NEVI: ICD-10-CM

## 2024-06-11 DIAGNOSIS — L82.1 OTHER SEBORRHEIC KERATOSIS: ICD-10-CM

## 2024-06-11 DIAGNOSIS — L30.9 DERMATITIS, UNSPECIFIED: ICD-10-CM

## 2024-06-11 DIAGNOSIS — Z71.89 OTHER SPECIFIED COUNSELING: ICD-10-CM

## 2024-06-11 DIAGNOSIS — D18.0 HEMANGIOMA: ICD-10-CM

## 2024-06-11 PROBLEM — D18.01 HEMANGIOMA OF SKIN AND SUBCUTANEOUS TISSUE: Status: ACTIVE | Noted: 2024-06-11

## 2024-06-11 PROBLEM — D22.61 MELANOCYTIC NEVI OF RIGHT UPPER LIMB, INCLUDING SHOULDER: Status: ACTIVE | Noted: 2024-06-11

## 2024-06-11 PROCEDURE — ? TREATMENT REGIMEN

## 2024-06-11 PROCEDURE — ? PRESCRIPTION

## 2024-06-11 PROCEDURE — ? OBSERVATION AND MEASURE

## 2024-06-11 PROCEDURE — ? COUNSELING

## 2024-06-11 PROCEDURE — 99213 OFFICE O/P EST LOW 20 MIN: CPT

## 2024-06-11 PROCEDURE — ? ADDITIONAL NOTES

## 2024-06-11 RX ORDER — CLINDAMYCIN PHOSPHATE 10 MG/ML
1 LOTION TOPICAL BID
Qty: 60 | Refills: 11 | Status: ERX | COMMUNITY
Start: 2024-06-11

## 2024-06-11 RX ADMIN — CLINDAMYCIN PHOSPHATE 1: 10 LOTION TOPICAL at 00:00

## 2024-06-11 ASSESSMENT — LOCATION DETAILED DESCRIPTION DERM
LOCATION DETAILED: NASAL DORSUM
LOCATION DETAILED: RIGHT DISTAL POSTERIOR UPPER ARM
LOCATION DETAILED: RIGHT POSTERIOR SHOULDER
LOCATION DETAILED: LEFT LATERAL INFERIOR EYELID
LOCATION DETAILED: RIGHT CENTRAL LATERAL NECK
LOCATION DETAILED: RIGHT PROXIMAL POSTERIOR UPPER ARM
LOCATION DETAILED: RIGHT MEDIAL INFERIOR EYELID

## 2024-06-11 ASSESSMENT — LOCATION ZONE DERM
LOCATION ZONE: EYELID
LOCATION ZONE: ARM
LOCATION ZONE: NOSE
LOCATION ZONE: NECK

## 2024-06-11 ASSESSMENT — LOCATION SIMPLE DESCRIPTION DERM
LOCATION SIMPLE: RIGHT INFERIOR EYELID
LOCATION SIMPLE: NOSE
LOCATION SIMPLE: RIGHT SHOULDER
LOCATION SIMPLE: RIGHT POSTERIOR UPPER ARM
LOCATION SIMPLE: LEFT INFERIOR EYELID
LOCATION SIMPLE: NECK

## 2024-06-11 NOTE — PROCEDURE: TREATMENT REGIMEN
Continue Regimen: Hydroquinone 4% cream apply to affected skin twice a day x2 months take 1 month. Repeat must use with sunscreen
Detail Level: Detailed
Initiate Treatment: -clindamycin 1 % lotion Apply to affected area twice daily
Detail Level: Zone

## 2024-06-11 NOTE — PROCEDURE: OBSERVATION
Detail Level: Detailed
Morphology Per Location (Optional): patch of hyperpigmentation with surrounding tank and hypo pigmented macules
Size Of Lesion: 1.3 cm
Size Of Lesion: 3mm
Size Of Lesion: 1mm

## 2024-06-11 NOTE — PROCEDURE: ADDITIONAL NOTES
Additional Notes: -6/11/24 consider rhofade. Discussed ktp laser
Render Risk Assessment In Note?: yes
Detail Level: Zone

## 2025-02-19 NOTE — TELEPHONE ENCOUNTER
----- Message from Rosaura Cintron sent at 8/14/2018  5:44 PM PDT -----  Regarding: Procedure Question  Contact: [phone]  Corrie Joseph,  I have orders in for bloodwork to test my thyroid as well as my liver because of the antifungal medicine I've been taking.  Could you also add the  test to it as well?  I think it's been a couple of years since I have had that tested, and Dr. Marte used to test it annually until I was no longer under his care. I'd just feel better to see that everything is still good as far as any cancer detection.  I will be getting my bloodwork done next week.  Thank you,  Rosaura Cintron  [phone]   Please bring all medicines, vitamins, and herbal supplements with you when you come to the office.    Prescriptions will not be filled unless you are compliant with your follow up appointments or have a follow up appointment scheduled as per instruction of your physician. Refills should be requested at the time of your visit.

## 2025-02-24 ENCOUNTER — APPOINTMENT (OUTPATIENT)
Dept: URBAN - METROPOLITAN AREA CLINIC 35 | Facility: CLINIC | Age: 55
Setting detail: DERMATOLOGY
End: 2025-02-24

## 2025-02-24 DIAGNOSIS — Z71.89 OTHER SPECIFIED COUNSELING: ICD-10-CM

## 2025-02-24 DIAGNOSIS — L82.0 INFLAMED SEBORRHEIC KERATOSIS: ICD-10-CM

## 2025-02-24 DIAGNOSIS — L81.4 OTHER MELANIN HYPERPIGMENTATION: ICD-10-CM

## 2025-02-24 DIAGNOSIS — Z41.9 ENCOUNTER FOR PROCEDURE FOR PURPOSES OTHER THAN REMEDYING HEALTH STATE, UNSPECIFIED: ICD-10-CM

## 2025-02-24 DIAGNOSIS — L71.8 OTHER ROSACEA: ICD-10-CM | Status: INADEQUATELY CONTROLLED

## 2025-02-24 PROBLEM — D23.62 OTHER BENIGN NEOPLASM OF SKIN OF LEFT UPPER LIMB, INCLUDING SHOULDER: Status: ACTIVE | Noted: 2025-02-24

## 2025-02-24 PROCEDURE — ? COSMETIC CONSULTATION - RED SPOTS

## 2025-02-24 PROCEDURE — ? BENIGN DESTRUCTION COSMETIC

## 2025-02-24 PROCEDURE — ? PRESCRIPTION

## 2025-02-24 PROCEDURE — ? COUNSELING

## 2025-02-24 PROCEDURE — 17110 DESTRUCTION B9 LES UP TO 14: CPT | Mod: 52

## 2025-02-24 PROCEDURE — ? LIQUID NITROGEN

## 2025-02-24 PROCEDURE — 99214 OFFICE O/P EST MOD 30 MIN: CPT | Mod: 25

## 2025-02-24 PROCEDURE — ? TREATMENT REGIMEN

## 2025-02-24 RX ORDER — IVERMECTIN 10 MG/G
CREAM TOPICAL
Qty: 45 | Refills: 6 | Status: ERX | COMMUNITY
Start: 2025-02-24

## 2025-02-24 RX ADMIN — IVERMECTIN: 10 CREAM TOPICAL at 00:00

## 2025-02-24 ASSESSMENT — LOCATION DETAILED DESCRIPTION DERM
LOCATION DETAILED: LEFT CLAVICULAR NECK
LOCATION DETAILED: LEFT INFERIOR LATERAL NECK
LOCATION DETAILED: LEFT INFERIOR ANTERIOR NECK
LOCATION DETAILED: RIGHT IRIS
LOCATION DETAILED: RIGHT SUPERIOR LATERAL BUCCAL CHEEK
LOCATION DETAILED: LEFT RIB CAGE
LOCATION DETAILED: RIGHT INFERIOR CENTRAL MALAR CHEEK
LOCATION DETAILED: LEFT CENTRAL FRONTAL SCALP
LOCATION DETAILED: LEFT MEDIAL BREAST 10-11:00 REGION
LOCATION DETAILED: LEFT NASAL DORSUM
LOCATION DETAILED: RIGHT INFERIOR CENTRAL BUCCAL CHEEK
LOCATION DETAILED: LEFT MEDIAL SUPERIOR CHEST
LOCATION DETAILED: LEFT CHIN
LOCATION DETAILED: LEFT INFERIOR LATERAL BUCCAL CHEEK

## 2025-02-24 ASSESSMENT — LOCATION SIMPLE DESCRIPTION DERM
LOCATION SIMPLE: RIGHT EYE
LOCATION SIMPLE: ABDOMEN
LOCATION SIMPLE: CHEST
LOCATION SIMPLE: SCALP
LOCATION SIMPLE: LEFT CHEEK
LOCATION SIMPLE: LEFT ANTERIOR NECK
LOCATION SIMPLE: NOSE
LOCATION SIMPLE: LEFT BREAST
LOCATION SIMPLE: RIGHT CHEEK
LOCATION SIMPLE: CHIN

## 2025-02-24 ASSESSMENT — LOCATION ZONE DERM
LOCATION ZONE: FACE
LOCATION ZONE: NOSE
LOCATION ZONE: TRUNK
LOCATION ZONE: CORNEA
LOCATION ZONE: NECK
LOCATION ZONE: SCALP

## 2025-02-24 NOTE — PROCEDURE: COUNSELING
Detail Level: Detailed
Detail Level: Generalized
Laser Recommendations: KTP laser quoted 440$
Detail Level: Zone

## 2025-02-24 NOTE — PROCEDURE: LIQUID NITROGEN
Post-Care Instructions: I reviewed with the patient in detail post-care instructions. Patient is to wear sunprotection, and avoid picking at any of the treated lesions. Pt may apply Vaseline to crusted or scabbing areas.
Number Of Freeze-Thaw Cycles: 2 freeze-thaw cycles
Medical Necessity Information: It is in your best interest to select a reason for this procedure from the list below. All of these items fulfill various CMS LCD requirements except the new and changing color options.
Render Note In Bullet Format When Appropriate: Yes
Spray Paint Text: The liquid nitrogen was applied to the skin utilizing a spray paint frosting technique.
Detail Level: Detailed
Application Tool (Optional): Cry-AC
Medical Necessity Clause: This procedure was medically necessary because the lesions that were treated were:
Spray Paint Technique: No
Consent: The patient's verbal consent was obtained including but not limited to risks of crusting, scabbing, blistering, scarring, darker or lighter pigmentary change, recurrence, incomplete removal and infection.

## 2025-02-24 NOTE — PROCEDURE: COSMETIC CONSULTATION - RED SPOTS
Consultation Charge $ (Use Numbers Only, No Text Please.): 75.00
Detail Level: Detailed
Send Procedure Quote As Charge: No

## 2025-02-24 NOTE — PROCEDURE: TREATMENT REGIMEN
Initiate Treatment: - ivermectin 1 % topical cream. Apply a thin layer to affected areas once daily
Detail Level: Zone

## 2025-03-11 ENCOUNTER — RX ONLY (RX ONLY)
Age: 55
End: 2025-03-11

## 2025-03-11 RX ORDER — IVERMECTIN/METRONIDAZOLE/NIACI 1 %-1 %-4%
1 GEL (GRAM) TOPICAL QD
Qty: 30 | Refills: 6 | Status: ERX | COMMUNITY
Start: 2025-03-11

## 2025-05-02 ENCOUNTER — HOSPITAL ENCOUNTER (OUTPATIENT)
Dept: LAB | Facility: MEDICAL CENTER | Age: 55
End: 2025-05-02
Attending: FAMILY MEDICINE
Payer: COMMERCIAL

## 2025-05-02 LAB
25(OH)D3 SERPL-MCNC: 92 NG/ML (ref 30–100)
ALBUMIN SERPL BCP-MCNC: 4 G/DL (ref 3.2–4.9)
ALBUMIN/GLOB SERPL: 1.3 G/DL
ALP SERPL-CCNC: 76 U/L (ref 30–99)
ALT SERPL-CCNC: 23 U/L (ref 2–50)
ANION GAP SERPL CALC-SCNC: 9 MMOL/L (ref 7–16)
AST SERPL-CCNC: 26 U/L (ref 12–45)
BASOPHILS # BLD AUTO: 0.5 % (ref 0–1.8)
BASOPHILS # BLD: 0.04 K/UL (ref 0–0.12)
BILIRUB SERPL-MCNC: 0.4 MG/DL (ref 0.1–1.5)
BUN SERPL-MCNC: 15 MG/DL (ref 8–22)
CALCIUM ALBUM COR SERPL-MCNC: 9.3 MG/DL (ref 8.5–10.5)
CALCIUM SERPL-MCNC: 9.3 MG/DL (ref 8.5–10.5)
CHLORIDE SERPL-SCNC: 105 MMOL/L (ref 96–112)
CHOLEST SERPL-MCNC: 246 MG/DL (ref 100–199)
CO2 SERPL-SCNC: 26 MMOL/L (ref 20–33)
CREAT SERPL-MCNC: 0.87 MG/DL (ref 0.5–1.4)
EOSINOPHIL # BLD AUTO: 0.25 K/UL (ref 0–0.51)
EOSINOPHIL NFR BLD: 3.2 % (ref 0–6.9)
ERYTHROCYTE [DISTWIDTH] IN BLOOD BY AUTOMATED COUNT: 45.7 FL (ref 35.9–50)
EST. AVERAGE GLUCOSE BLD GHB EST-MCNC: 108 MG/DL
ESTRADIOL SERPL-MCNC: 51.1 PG/ML
FASTING STATUS PATIENT QL REPORTED: NORMAL
FSH SERPL-ACNC: 67 MIU/ML
GFR SERPLBLD CREATININE-BSD FMLA CKD-EPI: 79 ML/MIN/1.73 M 2
GLOBULIN SER CALC-MCNC: 3.1 G/DL (ref 1.9–3.5)
GLUCOSE SERPL-MCNC: 87 MG/DL (ref 65–99)
HBA1C MFR BLD: 5.4 % (ref 4–5.6)
HCT VFR BLD AUTO: 44.7 % (ref 37–47)
HDLC SERPL-MCNC: 49 MG/DL
HGB BLD-MCNC: 14.4 G/DL (ref 12–16)
IMM GRANULOCYTES # BLD AUTO: 0.01 K/UL (ref 0–0.11)
IMM GRANULOCYTES NFR BLD AUTO: 0.1 % (ref 0–0.9)
LDLC SERPL CALC-MCNC: 172 MG/DL
LH SERPL-ACNC: 61.4 IU/L
LYMPHOCYTES # BLD AUTO: 3 K/UL (ref 1–4.8)
LYMPHOCYTES NFR BLD: 38.5 % (ref 22–41)
MCH RBC QN AUTO: 30.6 PG (ref 27–33)
MCHC RBC AUTO-ENTMCNC: 32.2 G/DL (ref 32.2–35.5)
MCV RBC AUTO: 94.9 FL (ref 81.4–97.8)
MONOCYTES # BLD AUTO: 0.4 K/UL (ref 0–0.85)
MONOCYTES NFR BLD AUTO: 5.1 % (ref 0–13.4)
NEUTROPHILS # BLD AUTO: 4.1 K/UL (ref 1.82–7.42)
NEUTROPHILS NFR BLD: 52.6 % (ref 44–72)
NRBC # BLD AUTO: 0 K/UL
NRBC BLD-RTO: 0 /100 WBC (ref 0–0.2)
PLATELET # BLD AUTO: 313 K/UL (ref 164–446)
PMV BLD AUTO: 10.5 FL (ref 9–12.9)
POTASSIUM SERPL-SCNC: 4.1 MMOL/L (ref 3.6–5.5)
PROT SERPL-MCNC: 7.1 G/DL (ref 6–8.2)
RBC # BLD AUTO: 4.71 M/UL (ref 4.2–5.4)
SODIUM SERPL-SCNC: 140 MMOL/L (ref 135–145)
T3 SERPL-MCNC: 92.5 NG/DL (ref 60–181)
T4 FREE SERPL-MCNC: 1.53 NG/DL (ref 0.93–1.7)
TRIGL SERPL-MCNC: 123 MG/DL (ref 0–149)
TSH SERPL-ACNC: 2.22 UIU/ML (ref 0.38–5.33)
WBC # BLD AUTO: 7.8 K/UL (ref 4.8–10.8)

## 2025-05-02 PROCEDURE — 80061 LIPID PANEL: CPT

## 2025-05-02 PROCEDURE — 83001 ASSAY OF GONADOTROPIN (FSH): CPT

## 2025-05-02 PROCEDURE — 83002 ASSAY OF GONADOTROPIN (LH): CPT

## 2025-05-02 PROCEDURE — 82570 ASSAY OF URINE CREATININE: CPT

## 2025-05-02 PROCEDURE — 84443 ASSAY THYROID STIM HORMONE: CPT

## 2025-05-02 PROCEDURE — 36415 COLL VENOUS BLD VENIPUNCTURE: CPT

## 2025-05-02 PROCEDURE — 83036 HEMOGLOBIN GLYCOSYLATED A1C: CPT

## 2025-05-02 PROCEDURE — 84270 ASSAY OF SEX HORMONE GLOBUL: CPT

## 2025-05-02 PROCEDURE — 82670 ASSAY OF TOTAL ESTRADIOL: CPT

## 2025-05-02 PROCEDURE — 84480 ASSAY TRIIODOTHYRONINE (T3): CPT

## 2025-05-02 PROCEDURE — 80053 COMPREHEN METABOLIC PANEL: CPT

## 2025-05-02 PROCEDURE — 85025 COMPLETE CBC W/AUTO DIFF WBC: CPT

## 2025-05-02 PROCEDURE — 82306 VITAMIN D 25 HYDROXY: CPT

## 2025-05-02 PROCEDURE — 84439 ASSAY OF FREE THYROXINE: CPT

## 2025-05-02 PROCEDURE — 84403 ASSAY OF TOTAL TESTOSTERONE: CPT

## 2025-05-02 PROCEDURE — 82043 UR ALBUMIN QUANTITATIVE: CPT

## 2025-05-02 PROCEDURE — 84402 ASSAY OF FREE TESTOSTERONE: CPT

## 2025-05-03 LAB
COLLECT DURATION TIME SPEC: NORMAL HR
CREAT 24H UR-MCNC: 133 MG/DL
MICROALBUMIN 24H UR-MCNC: 3.4 MG/DL
MICROALBUMIN/CREAT 24H UR: 26 MG/G (ref 0–30)
SPECIMEN VOL ?TM UR: NORMAL ML

## 2025-05-05 NOTE — Clinical Note
REFERRAL APPROVAL NOTICE         Sent on May 5, 2025                   Rosaura Tari Saida  [address] [place] [address]                   Dear Ms. Cintron,    After a careful review of the medical information and benefit coverage, [facility] has processed your referral. See below for additional details.    If applicable, you must be actively enrolled with your insurance for coverage of the authorized service. If you have any questions regarding your coverage, please contact your insurance directly.    REFERRAL INFORMATION   Referral #:  [id number]  Referred-To Department    Referred-By Provider:  Hematology Oncology    Merlyn Garcia C.N.M.   [place] [facility] [place]      [address] [place] [address]  [phone] [address] [facility] [address]  [place] [id number] [phone]    Referral Start Date:  05/05/2025  Referral End Date:   *** No expiration date on the referral.           SCHEDULING  If you do not already have an appointment, please call [phone] to make an appointment.   MORE INFORMATION  As a reminder, [facility] ownership has changed, meaning this location is now owned and operated by [facility]. As such, we want to clarify that our patients should expect to receive two separate bills for the services received at [facility] - one representing the [facility] facility fees as the owner of the establishment, and the other to represent the physician's services and subsequent fees. You can speak with your insurance carrier for a pricing estimate by calling the customer service number on the back of your card and ask about charges for a hospital outpatient visit.  If you do not already have a [email or web address] account, sign up at: [email or web address]  You can access your medical information, make appointments, see lab results, billing information, and more.  If you have questions regarding this referral, please contact  the [facility] Referrals department at:              [phone]. Monday - Friday 7:30AM - 5:00PM.      Sincerely,  [facility]

## 2025-05-07 LAB
SHBG SERPL-SCNC: 33 NMOL/L (ref 17–125)
TESTOST FREE SERPL-MCNC: 3.1 PG/ML (ref 0.6–3.8)
TESTOST SERPL-MCNC: 18 NG/DL (ref 9–55)
TESTOSTERONE.FREE+WB SERPL-MCNC: 8 NG/DL (ref 1.5–9.4)

## 2025-06-10 ENCOUNTER — HOSPITAL ENCOUNTER (OUTPATIENT)
Facility: MEDICAL CENTER | Age: 55
End: 2025-06-10
Attending: STUDENT IN AN ORGANIZED HEALTH CARE EDUCATION/TRAINING PROGRAM
Payer: COMMERCIAL

## 2025-06-10 VITALS
HEIGHT: 66 IN | DIASTOLIC BLOOD PRESSURE: 62 MMHG | WEIGHT: 167.5 LBS | TEMPERATURE: 99 F | SYSTOLIC BLOOD PRESSURE: 108 MMHG | OXYGEN SATURATION: 97 % | BODY MASS INDEX: 26.92 KG/M2 | HEART RATE: 72 BPM

## 2025-06-10 DIAGNOSIS — C50.919 MALIGNANT NEOPLASM OF FEMALE BREAST, UNSPECIFIED ESTROGEN RECEPTOR STATUS, UNSPECIFIED LATERALITY, UNSPECIFIED SITE OF BREAST (HCC): ICD-10-CM

## 2025-06-10 DIAGNOSIS — C50.919 MALIGNANT NEOPLASM OF FEMALE BREAST, UNSPECIFIED ESTROGEN RECEPTOR STATUS, UNSPECIFIED LATERALITY, UNSPECIFIED SITE OF BREAST (HCC): Primary | ICD-10-CM

## 2025-06-10 PROCEDURE — 99204 OFFICE O/P NEW MOD 45 MIN: CPT | Performed by: STUDENT IN AN ORGANIZED HEALTH CARE EDUCATION/TRAINING PROGRAM

## 2025-06-10 PROCEDURE — 99212 OFFICE O/P EST SF 10 MIN: CPT | Performed by: STUDENT IN AN ORGANIZED HEALTH CARE EDUCATION/TRAINING PROGRAM

## 2025-06-10 RX ORDER — CHLORAL HYDRATE 500 MG
1000 CAPSULE ORAL
COMMUNITY

## 2025-06-10 RX ORDER — ATORVASTATIN CALCIUM 20 MG/1
TABLET, FILM COATED ORAL
COMMUNITY

## 2025-06-10 RX ORDER — COVID-19 ANTIGEN TEST
KIT MISCELLANEOUS PRN
COMMUNITY

## 2025-06-10 RX ORDER — ACETAMINOPHEN 500 MG
500-1000 TABLET ORAL EVERY 6 HOURS PRN
COMMUNITY

## 2025-06-10 RX ORDER — CYCLOSPORINE 0.5 MG/ML
EMULSION OPHTHALMIC
COMMUNITY

## 2025-06-10 RX ORDER — OXYBUTYNIN CHLORIDE 5 MG/1
5 TABLET, EXTENDED RELEASE ORAL DAILY
Qty: 30 TABLET | Refills: 1 | Status: SHIPPED | OUTPATIENT
Start: 2025-06-10 | End: 2025-06-11

## 2025-06-10 RX ORDER — LISDEXAMFETAMINE DIMESYLATE 70 MG/1
70 CAPSULE ORAL EVERY MORNING
COMMUNITY

## 2025-06-10 ASSESSMENT — FIBROSIS 4 INDEX: FIB4 SCORE: 0.94

## 2025-06-10 ASSESSMENT — PAIN SCALES - GENERAL: PAINLEVEL_OUTOF10: NO PAIN

## 2025-06-10 NOTE — PROGRESS NOTES
Consult:  Hematology/Oncology    Primary Care:  Gloria Joseph M.D.  Surgical Oncologist: {MGNAME:14651}  Radiation Oncologist: {MGNAME:57953}  Wellness Oncologist: {MGNAME:35336}  Plastic Surgeon: {MGNAME:95398}    Diagnosis: ***    Treatment:    Chief Complaint:  Establish Care     History of Presenting Illness:  Rosaura Cintron is a 55 y.o. female with PMH *** who presents today to establish care for the above diagnosis      IDC G1 measuring.9cm [id number] negative ER/[place]+ HER2 2+ FISH negative L breast, R breast biopsy with fibroadenoma.  SLNB x3 negative x5 years tamoxifen     Follows with DR. Soria had refplacement in November has follow up freddytjeri in July     Then got R mastectomy   Past Medical History[1]    Past Surgical History[2]    Social History     Socioeconomic History    Marital status:      Spouse name: Not on file    Number of children: 2    Years of education: Not on file    Highest education level: Not on file   Occupational History    Occupation: Speech Language Pathologist - [employer]     Employer: [employer]   Tobacco Use    Smoking status: Never    Smokeless tobacco: Never   Vaping Use    Vaping status: Never Used   Substance and Sexual Activity    Alcohol use: Yes     Alcohol/week: 1.2 oz     Types: 2 Glasses of wine per week     Comment: 1-2 dinks per month     Drug use: No    Sexual activity: Not on file   Other Topics Concern    Not on file   Social History Narrative    Not on file     Social Drivers of Health     Financial Resource Strain: Not on file   Food Insecurity: Not on file   Transportation Needs: Not on file   Physical Activity: Not on file   Stress: Not on file   Social Connections: Not on file   Intimate Partner Violence: Not on file   Housing Stability: Not on file       Family History   Problem Relation Age of Onset    Heart Disease Maternal Grandfather     Diabetes Mother     Hypertension Mother     Cancer Mother         skin - non-melanoma  "   Thyroid Mother     Diabetes Maternal Grandmother     Hypertension Father     Cancer Father         prostate    Stroke Paternal Grandfather     Heart Disease Maternal Uncle        OB History    Para Term  AB Living   2 2       SAB IAB Ectopic Molar Multiple Live Births              # Outcome Date GA Lbr Barber/2nd Weight Sex Type Anes PTL Lv   2 Para            1 Para                Allergies as of 06/10/2025 - Reviewed 06/10/2025   Allergen Reaction Noted    Erythromycin Anaphylaxis and Swelling 2008       Current Medications[3]    Review of Systems:  ROS as above    Physical Exam:  Vitals:    06/10/25 1428   BP: 108/62   BP Location: Right arm   Patient Position: Sitting   Pulse: 72   Temp: 37.2 °C (99 °F)   TempSrc: Temporal   SpO2: 97%   Weight: 76 kg (167 lb 8 oz)   Height: 1.676 m (5' 6\")       {DESC; KARNOFSKY SCALE WITH ECOG EQUIVALENT:94084:::1}    {DISTRESS LEVEL:39683:::1}    Physical Exam     Depression Screening    Little interest or pleasure in doing things?      Feeling down, depressed , or hopeless?     Trouble falling or staying asleep, or sleeping too much?      Feeling tired or having little energy?      Poor appetite or overeating?      Feeling bad about yourself - or that you are a failure or have let yourself or your family down?     Trouble concentrating on things, such as reading the newspaper or watching television?     Moving or speaking so slowly that other people could have noticed.  Or the opposite - being so fidgety or restless that you have been moving around a lot more than usual?      Thoughts that you would be better off dead, or of hurting yourself?      Patient Health Questionnaire Score:       If depressive symptoms identified deferred to follow up visit unless specifically addressed in assesment and plan.    Interpretation of PHQ-9 Total Score   Score Severity   1-4 No Depression   5-9 Mild Depression   10-14 Moderate Depression   15-19 Moderately Severe " Depression   20-27 Severe Depression    Labs:  Lab Results   Component Value Date/Time    WBC 7.8 05/02/2025 06:58 AM    RBC 4.71 05/02/2025 06:58 AM    HEMOGLOBIN 14.4 05/02/2025 06:58 AM    HEMATOCRIT 44.7 05/02/2025 06:58 AM    MCV 94.9 05/02/2025 06:58 AM    MCH 30.6 05/02/2025 06:58 AM    MCHC 32.2 05/02/2025 06:58 AM    MPV 10.5 05/02/2025 06:58 AM    NEUTSPOLYS 52.60 05/02/2025 06:58 AM    LYMPHOCYTES 38.50 05/02/2025 06:58 AM    MONOCYTES 5.10 05/02/2025 06:58 AM    EOSINOPHILS 3.20 05/02/2025 06:58 AM    BASOPHILS 0.50 05/02/2025 06:58 AM      Lab Results   Component Value Date/Time    SODIUM 140 05/02/2025 06:58 AM    POTASSIUM 4.1 05/02/2025 06:58 AM    CHLORIDE 105 05/02/2025 06:58 AM    CO2 26 05/02/2025 06:58 AM    GLUCOSE 87 05/02/2025 06:58 AM    BUN 15 05/02/2025 06:58 AM    CREATININE 0.87 05/02/2025 06:58 AM    BUNCREATRAT 14 10/05/2020 05:04 AM        Imaging:   Screening:  No results found for this or any previous visit from the past 365 days.      Diagnostic:  No results found for this or any previous visit from the past 365 days.      No results found for this or any previous visit from the past 365 days.       No results found for this or any previous visit from the past 365 days.        Pathology:  Pathology Results (Last result in 90 days)    None          Assessment & Plan:  Rosaura Cintron is a 55 y.o. [unfilled] who presents today to establish care for ***    Treatment Plan: discussed patients pathology results including staging at length with them today and reviewed NCCN guidelines.  Shared decision making model employed during duration of this visit.     Genetics:     Barriers to Care:    HCM: encouraged exercise    Surveillance:      Any questions and concerns raised by the patient were answered to the best of my ability. Thank you for allowing me to participate in the care for this patient. Please feel free to contact me for any questions or concerns.     Total time spent on chart  review, clinic encounter, and documentation: *** minutes.          [1]   Past Medical History:  Diagnosis Date    ADD (attention deficit disorder with hyperactivity)     Dr. Desi Henderson    Anesthesia     PONV    Cancer ([facility]) 2006    breast    Disorder of thyroid     Invasive ductal carcinoma of breast (HCC) 2006    Breast left, Dr. Marte    Pain     hip, lower back   [2]   Past Surgical History:  Procedure Laterality Date    BREAST IMPLANT REVISION Bilateral 5/26/2016    Procedure: BREAST IMPLANT FOR EXCHANGE WITH PLACEMENT OF TEXTURED ROUND IMPLANTS;  Surgeon: Jeremiah Soria M.D.;  Location: SURGERY SAME DAY [facility];  Service:     BREAST RECONSTRUCTION Bilateral 5/26/2016    Procedure: BREAST RECONSTRUCTION FAT GRAFTING;  Surgeon: Jeremiah Soria M.D.;  Location: SURGERY SAME DAY [facility];  Service:     NIPPLE RECONSTRUCTION Bilateral 5/26/2016    Procedure: NIPPLE RECONSTRUCTION AREOLAR;  Surgeon: Jeremiah Soria M.D.;  Location: SURGERY SAME DAY [facility];  Service:     FULL THICKNESS SKIN GRAFT  5/26/2016    Procedure: FULL THICKNESS SKIN GRAFT;  Surgeon: Jeremiah Soria M.D.;  Location: SURGERY SAME DAY [facility];  Service:     CAPSULOTOMY  5/26/2016    Procedure: CAPSULOTOMY FOR CAPSULECTOMIES WITH CAPSULORRHAPHY USE OF ACELLULAR DERMAL MATRIX ON THE LEFT TO SUPPORT THE INFRAMAMMARY FOLD;  Surgeon: Jeremiah Soria M.D.;  Location: SURGERY SAME DAY [facility];  Service:     HYSTERECTOMY ROBOTIC  10/30/2012    Performed by Vivien Rajput M.D. at SURGERY [facility]    CYSTOSCOPY  10/30/2012    Performed by Vivien Rajput M.D. at SURGERY [facility]    SALPINGECTOMY  10/30/2012    Performed by Vivien Rajput M.D. at SURGERY [facility]    LYSIS ADHESIONS GYN  10/30/2012    Performed by Vivien Rajput M.D. at SURGERY [facility]    NIPPLE RECONSTRUCTION  11/15/2011    Performed by XENA DIA at SURGERY [facility]    CERVICAL  CONIZATION  3/29/2010    Performed by LAKESHA CASAS at SURGERY SAME DAY [facility]    DC ENDOMETRIAL ABLATION, THERMAL  11/2009    BREAST IMPLANT REMOVAL  6/18/2009    Performed by XENA DIA at SURGERY [facility]    BREAST IMPLANT REVISION  6/18/2009    Performed by XENA DIA at SURGERY [facility]    CAPSULECTOMY  6/18/2009    Performed by XENA DIA at SURGERY [facility]    BLADDER SLING FEMALE  3/20/2009    Performed by SCOTT FONTENOT at SURGERY SAME DAY [facility]    CYSTOSCOPY  3/20/2009    Performed by SCOTT FONTENOT at SURGERY SAME DAY [facility]    BREAST RECONSTRUCTION  3/08    MASTECTOMY  12/20/07    Right    MASTECTOMY  11/16/06    Left    BUNIONECTOMY  04/05    Left    CARPAL TUNNEL RELEASE  03/05    bilateral    EYE SURGERY  01/03    Lasik OU    TONSILLECTOMY AND ADENOIDECTOMY  03/92   [3]   Current Outpatient Medications:     atorvastatin (LIPITOR) 20 MG Tab, , Disp: , Rfl:     lisdexamfetamine (VYVANSE) 70 MG capsule, Take 70 mg by mouth every morning., Disp: , Rfl:     cycloSPORINE (RESTASIS) 0.05 % ophthalmic emulsion, INSTILL 1 DROP IN BOTH EYES TWICE DAILY, Disp: , Rfl:     Tirzepatide 7.5 MG/0.5ML Solution Auto-injector, , Disp: , Rfl:     Omega-3 Fatty Acids (FISH OIL) 1000 MG Cap capsule, Take 1,000 mg by mouth 3 times a day with meals., Disp: , Rfl:     acetaminophen (TYLENOL) 500 MG Tab, Take 500-1,000 mg by mouth every 6 hours as needed for Moderate Pain., Disp: , Rfl:     Naproxen Sodium (ALEVE) 220 MG Cap, Take  by mouth as needed (pain)., Disp: , Rfl:     SYNTHROID 100 MCG Tab, TAKE 1 TABLET EVERY MORNINGON AN EMPTY STOMACH, Disp: 30 tablet, Rfl: 0    liothyronine (CYTOMEL) 5 MCG Tab, TAKE 1 TABLET TWICE A DAY, Disp: 180 Tab, Rfl: 1    VITAMIN D, ERGOCALCIFEROL, PO, Take  by mouth every day.  , Disp: , Rfl:     Multiple Vitamin (MULTIVITAMIN PO), Take  by mouth every day.  , Disp: , Rfl:     meloxicam (MOBIC) 15 MG tablet, TAKE 1  TABLET BY MOUTH EVERY DAY, Disp: 30 Tablet, Rfl: 0    Calcium Carbonate-Vitamin D (CALCIUM + D PO), Take  by mouth every day.  , Disp: , Rfl:

## 2025-06-11 ENCOUNTER — PATIENT OUTREACH (OUTPATIENT)
Dept: ONCOLOGY | Facility: MEDICAL CENTER | Age: 55
End: 2025-06-11
Payer: COMMERCIAL

## 2025-06-11 RX ORDER — OXYBUTYNIN CHLORIDE 5 MG/1
5 TABLET, EXTENDED RELEASE ORAL DAILY
Qty: 90 TABLET | Refills: 0 | Status: SHIPPED | OUTPATIENT
Start: 2025-06-11

## 2025-06-11 NOTE — PROGRESS NOTES
ONN called patient to follow up on a referral.  Patient did not answer left a voicemail with my contact information.

## 2025-06-11 NOTE — Clinical Note
REFERRAL APPROVAL NOTICE         Sent on June 11, 2025                   Rosaura Cintorn  [address] [place] [address]                   Dear Ms. Cintron,    After a careful review of the medical information and benefit coverage, [facility] has processed your referral. See below for additional details.    If applicable, you must be actively enrolled with your insurance for coverage of the authorized service. If you have any questions regarding your coverage, please contact your insurance directly.    REFERRAL INFORMATION   Referral #:  [id number]  Referred-To Department    Referred-By Provider:  Surgical Oncology    Lori Pereira D.O.   [facility]      [address]  [address]  [place] [address]  [phone] [address]  [place] [address] [phone]    Referral Start Date:  06/10/2025  Referral End Date:   06/10/2026           SCHEDULING  If you do not already have an appointment, please call [phone] to make an appointment.   MORE INFORMATION  As a reminder, [facility] Breast [facility] ownership has changed, meaning this location is now owned and operated by [facility]. As such, we want to clarify that our patients should expect to receive two separate bills for the services received at [facility] - one representing the [facility] facility fees as the owner of the establishment, and the other to represent the physician's services and subsequent fees. You can speak with your insurance carrier for a pricing estimate by calling the customer service number on the back of your card and ask about charges for a hospital outpatient visit.  If you do not already have a [employer] account, sign up at: [email or web address]  You can access your medical information, make appointments, see lab results, billing information, and more.  If you have questions regarding this referral, please contact  the [facility] Referrals department at:              [phone]. Monday - Friday 7:30AM - 5:00PM.      Sincerely,  [facility]

## 2025-06-12 ENCOUNTER — TELEPHONE (OUTPATIENT)
Age: 55
End: 2025-06-12
Payer: COMMERCIAL

## 2025-06-12 NOTE — TELEPHONE ENCOUNTER
Lvm for patient to schedule a new patient consult with Dr. Latisha Jacobs for Oncology Wellness.     Kj

## 2025-07-10 ENCOUNTER — HOSPITAL ENCOUNTER (OUTPATIENT)
Facility: MEDICAL CENTER | Age: 55
End: 2025-07-10
Attending: STUDENT IN AN ORGANIZED HEALTH CARE EDUCATION/TRAINING PROGRAM
Payer: COMMERCIAL

## 2025-07-10 NOTE — PROGRESS NOTES
Consult:  Hematology/Oncology    Primary Care:  Gloria Joseph M.D.  Surgical Oncologist: {MGNAME:04272}  Radiation Oncologist: {MGNAME:21333}  Wellness Oncologist: {MGNAME:92243}  Plastic Surgeon: {MGNAME:34705}    Diagnosis: ***    Treatment:    Chief Complaint:  Establish Care     History of Presenting Illness:  Rosaura Cintron is a 55 y.o. female with PMH *** who presents today to establish care for the above diagnosis    Rosaura Cintron is a 55 y.o. female with PMH *** who presents today to establish care for the above diagnosis        IDC G1 measuring.9cm [id number] negative ER/[place]+ HER2 2+ FISH negative L breast, R breast biopsy with fibroadenoma.  SLNB x3 negative x5 years tamoxifen      Follows with DR. Soria had refplacement in November has follow up appjosseemtjeri in July      Then got R mastectomy     Hot flashes -oxybutinyn for about a week but with dry eyes and stopped the medication and manages with fan at bedside   Concerned about   Estrogen benefit for vaginal health   Prevention and dementia   Brain fog     More paddle boarding and kayacking     Get article     RTC 1 year  Past Medical History[1]    Past Surgical History[2]    Social History     Socioeconomic History   • Marital status:      Spouse name: Not on file   • Number of children: 2   • Years of education: Not on file   • Highest education level: Not on file   Occupational History   • 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   Tobacco Use   • Smoking status: Never   • Smokeless tobacco: Never   Vaping Use   • Vaping status: Never Used   Substance and Sexual Activity   • Alcohol use: Yes     Alcohol/week: 1.2 oz     Types: 2 Glasses of wine per week     Comment: 1-2 dinks per month    • Drug use: No   • Sexual activity: Not on file   Other Topics Concern   • Not on file   Social History Narrative   • Not on file     Social Drivers of Health     Financial Resource Strain: Not on  file   Food Insecurity: Not on file   Transportation Needs: Not on file   Physical Activity: Not on file   Stress: Not on file   Social Connections: Not on file   Intimate Partner Violence: Not on file   Housing Stability: Not on file       Family History   Problem Relation Age of Onset   • Heart Disease Maternal Grandfather    • Diabetes Mother    • Hypertension Mother    • Cancer Mother         skin - non-melanoma   • Thyroid Mother    • Diabetes Maternal Grandmother    • Hypertension Father    • Cancer Father         prostate   • Stroke Paternal Grandfather    • Heart Disease Maternal Uncle        OB History    Para Term  AB Living   2 2       SAB IAB Ectopic Molar Multiple Live Births              # Outcome Date GA Lbr Barber/2nd Weight Sex Type Anes PTL Lv   2 Para            1 Para                Allergies as of 07/10/2025 - Reviewed 07/10/2025   Allergen Reaction Noted   • Erythromycin Anaphylaxis and Swelling 2008       Current Medications[3]    Review of Systems:  ROS as above    Physical Exam:  There were no vitals filed for this visit.    {DESC; KARNOFSKY SCALE WITH ECOG EQUIVALENT:39679:::1}    {DISTRESS LEVEL:70484:::1}    Physical Exam     Depression Screening    Little interest or pleasure in doing things?      Feeling down, depressed , or hopeless?     Trouble falling or staying asleep, or sleeping too much?      Feeling tired or having little energy?      Poor appetite or overeating?      Feeling bad about yourself - or that you are a failure or have let yourself or your family down?     Trouble concentrating on things, such as reading the newspaper or watching television?     Moving or speaking so slowly that other people could have noticed.  Or the opposite - being so fidgety or restless that you have been moving around a lot more than usual?      Thoughts that you would be better off dead, or of hurting yourself?      Patient Health Questionnaire Score:       If depressive  symptoms identified deferred to follow up visit unless specifically addressed in assesment and plan.    Interpretation of PHQ-9 Total Score   Score Severity   1-4 No Depression   5-9 Mild Depression   10-14 Moderate Depression   15-19 Moderately Severe Depression   20-27 Severe Depression    Labs:  Lab Results   Component Value Date/Time    WBC 7.8 05/02/2025 06:58 AM    RBC 4.71 05/02/2025 06:58 AM    HEMOGLOBIN 14.4 05/02/2025 06:58 AM    HEMATOCRIT 44.7 05/02/2025 06:58 AM    MCV 94.9 05/02/2025 06:58 AM    MCH 30.6 05/02/2025 06:58 AM    MCHC 32.2 05/02/2025 06:58 AM    MPV 10.5 05/02/2025 06:58 AM    NEUTSPOLYS 52.60 05/02/2025 06:58 AM    LYMPHOCYTES 38.50 05/02/2025 06:58 AM    MONOCYTES 5.10 05/02/2025 06:58 AM    EOSINOPHILS 3.20 05/02/2025 06:58 AM    BASOPHILS 0.50 05/02/2025 06:58 AM      Lab Results   Component Value Date/Time    SODIUM 140 05/02/2025 06:58 AM    POTASSIUM 4.1 05/02/2025 06:58 AM    CHLORIDE 105 05/02/2025 06:58 AM    CO2 26 05/02/2025 06:58 AM    GLUCOSE 87 05/02/2025 06:58 AM    BUN 15 05/02/2025 06:58 AM    CREATININE 0.87 05/02/2025 06:58 AM    BUNCREATRAT 14 10/05/2020 05:04 AM        Imaging:   Screening:  No results found for this or any previous visit from the past 365 days.      Diagnostic:  No results found for this or any previous visit from the past 365 days.      No results found for this or any previous visit from the past 365 days.       No results found for this or any previous visit from the past 365 days.        Pathology:  Pathology Results (Last result in 90 days)    None          Assessment & Plan:  Rosaura Cintron is a 55 y.o. [unfilled] who presents today to establish care for ***    Treatment Plan: discussed patients pathology results including staging at length with them today and reviewed NCCN guidelines.  Shared decision making model employed during duration of this visit.     Genetics:     Barriers to Care:    HCM: encouraged exercise    Surveillance:      Any  questions and concerns raised by the patient were answered to the best of my ability. Thank you for allowing me to participate in the care for this patient. Please feel free to contact me for any questions or concerns.     Total time spent on chart review, clinic encounter, and documentation: *** minutes.              [1]  Past Medical History:  Diagnosis Date   • ADD (attention deficit disorder with hyperactivity)     Dr. Desi Henderson   • Anesthesia     PONV   • Cancer (HCC) 2006    breast   • Disorder of thyroid    • Invasive ductal carcinoma of breast (HCC) 2006    Breast left, Dr. Marte   • Pain     hip, lower back   [2]  Past Surgical History:  Procedure Laterality Date   • BREAST IMPLANT REVISION Bilateral 5/26/2016    Procedure: BREAST IMPLANT FOR EXCHANGE WITH PLACEMENT OF TEXTURED ROUND IMPLANTS;  Surgeon: Jeremiah Soria M.D.;  Location: SURGERY SAME DAY [facility];  Service:    • BREAST RECONSTRUCTION Bilateral 5/26/2016    Procedure: BREAST RECONSTRUCTION FAT GRAFTING;  Surgeon: Jeremiah Soria M.D.;  Location: SURGERY SAME DAY [facility];  Service:    • NIPPLE RECONSTRUCTION Bilateral 5/26/2016    Procedure: NIPPLE RECONSTRUCTION AREOLAR;  Surgeon: Jeremiah Soria M.D.;  Location: SURGERY SAME DAY [facility];  Service:    • FULL THICKNESS SKIN GRAFT  5/26/2016    Procedure: FULL THICKNESS SKIN GRAFT;  Surgeon: Jeremiah Soria M.D.;  Location: SURGERY SAME DAY [facility];  Service:    • CAPSULOTOMY  5/26/2016    Procedure: CAPSULOTOMY FOR CAPSULECTOMIES WITH CAPSULORRHAPHY USE OF ACELLULAR DERMAL MATRIX ON THE LEFT TO SUPPORT THE INFRAMAMMARY FOLD;  Surgeon: Jeremiah Soria M.D.;  Location: SURGERY SAME DAY [facility];  Service:    • HYSTERECTOMY ROBOTIC  10/30/2012    Performed by Vivien Rajput M.D. at SURGERY [facility]   • CYSTOSCOPY  10/30/2012    Performed by Vivien Rajput M.D. at SURGERY [facility]   • SALPINGECTOMY  10/30/2012    Performed by Vivien  CARLOS Fontenot M.D. at SURGERY [facility]   • LYSIS ADHESIONS GYN  10/30/2012    Performed by Vivien Fontenot M.D. at SURGERY [facility]   • NIPPLE RECONSTRUCTION  11/15/2011    Performed by XENA DIA at SURGERY [facility]   • CERVICAL CONIZATION  3/29/2010    Performed by LAKESHA CASAS at SURGERY SAME DAY [facility]   • [place] ENDOMETRIAL ABLATION, THERMAL  11/2009   • BREAST IMPLANT REMOVAL  6/18/2009    Performed by XENA DIA at SURGERY [facility]   • BREAST IMPLANT REVISION  6/18/2009    Performed by XENA DIA at SURGERY [facility]   • CAPSULECTOMY  6/18/2009    Performed by XENA DIA at SURGERY [facility]   • BLADDER SLING FEMALE  3/20/2009    Performed by VIVIEN FONTENOT at SURGERY SAME DAY [facility]S   • CYSTOSCOPY  3/20/2009    Performed by VIVIEN FONTENOT at SURGERY SAME DAY [facility]   • BREAST RECONSTRUCTION  3/08   • MASTECTOMY  12/20/07    Right   • MASTECTOMY  11/16/06    Left   • BUNIONECTOMY  04/05    Left   • CARPAL TUNNEL RELEASE  03/05    bilateral   • EYE SURGERY  01/03    Lasik OU   • TONSILLECTOMY AND ADENOIDECTOMY  03/92   [3]    Current Outpatient Medications:   •  atorvastatin (LIPITOR) 20 MG Tab, , Disp: , Rfl:   •  lisdexamfetamine (VYVANSE) 70 MG capsule, Take 70 mg by mouth every morning., Disp: , Rfl:   •  cycloSPORINE (RESTASIS) 0.05 % ophthalmic emulsion, INSTILL 1 DROP IN BOTH EYES TWICE DAILY, Disp: , Rfl:   •  Tirzepatide 7.5 MG/0.5ML Solution Auto-injector, , Disp: , Rfl:   •  Omega-3 Fatty Acids (FISH OIL) 1000 MG Cap capsule, Take 1,000 mg by mouth 3 times a day with meals., Disp: , Rfl:   •  acetaminophen (TYLENOL) 500 MG Tab, Take 500-1,000 mg by mouth every 6 hours as needed for Moderate Pain., Disp: , Rfl:   •  Naproxen Sodium (ALEVE) 220 MG Cap, Take  by mouth as needed (pain)., Disp: , Rfl:   •  SYNTHROID 100 MCG Tab, TAKE 1 TABLET EVERY MORNINGON AN EMPTY STOMACH, Disp: 30 tablet, Rfl: 0  •   liothyronine (CYTOMEL) 5 MCG Tab, TAKE 1 TABLET TWICE A DAY, Disp: 180 Tab, Rfl: 1  •  VITAMIN D, ERGOCALCIFEROL, PO, Take  by mouth every day.  , Disp: , Rfl:   •  Multiple Vitamin (MULTIVITAMIN PO), Take  by mouth every day.  , Disp: , Rfl:   •  oxybutynin SR (DITROPAN-XL) 5 MG TABLET SR 24 HR, TAKE 1 TABLET BY MOUTH EVERY DAY (Patient not taking: Reported on 7/10/2025), Disp: 90 Tablet, Rfl: 0  •  meloxicam (MOBIC) 15 MG tablet, TAKE 1 TABLET BY MOUTH EVERY DAY, Disp: 30 Tablet, Rfl: 0  •  Calcium Carbonate-Vitamin D (CALCIUM + D PO), Take  by mouth every day.  , Disp: , Rfl: